# Patient Record
Sex: FEMALE | Race: BLACK OR AFRICAN AMERICAN | NOT HISPANIC OR LATINO | URBAN - METROPOLITAN AREA
[De-identification: names, ages, dates, MRNs, and addresses within clinical notes are randomized per-mention and may not be internally consistent; named-entity substitution may affect disease eponyms.]

---

## 2023-06-30 LAB
ABO GROUP (TYPE) IN BLOOD: NORMAL
ANTIBODY SCREEN: NORMAL
ERYTHROCYTE DISTRIBUTION WIDTH (RATIO) BY AUTOMATED COUNT: 13.3 % (ref 11.5–14.5)
ERYTHROCYTE MEAN CORPUSCULAR HEMOGLOBIN CONCENTRATION (G/DL) BY AUTOMATED: 32.4 G/DL (ref 32–36)
ERYTHROCYTE MEAN CORPUSCULAR VOLUME (FL) BY AUTOMATED COUNT: 88 FL (ref 80–100)
ERYTHROCYTES (10*6/UL) IN BLOOD BY AUTOMATED COUNT: 4.61 X10E12/L (ref 4–5.2)
ESTIMATED AVERAGE GLUCOSE FOR HBA1C: 97 MG/DL
HEMATOCRIT (%) IN BLOOD BY AUTOMATED COUNT: 40.4 % (ref 36–46)
HEMOGLOBIN (G/DL) IN BLOOD: 13.1 G/DL (ref 12–16)
HEMOGLOBIN A1C/HEMOGLOBIN TOTAL IN BLOOD: 5 %
HEPATITIS B VIRUS SURFACE AG PRESENCE IN SERUM: NONREACTIVE
HEPATITIS C VIRUS AB PRESENCE IN SERUM: NONREACTIVE
HIV 1/ 2 AG/AB SCREEN: NONREACTIVE
LEUKOCYTES (10*3/UL) IN BLOOD BY AUTOMATED COUNT: 12.5 X10E9/L (ref 4.4–11.3)
NRBC (PER 100 WBCS) BY AUTOMATED COUNT: 0 /100 WBC (ref 0–0)
PLATELETS (10*3/UL) IN BLOOD AUTOMATED COUNT: 309 X10E9/L (ref 150–450)
REFLEX ADDED, ANEMIA PANEL: ABNORMAL
RH FACTOR: NORMAL
RUBELLA VIRUS IGG AB: POSITIVE
SYPHILIS TOTAL AB: NONREACTIVE

## 2023-07-02 LAB — URINE CULTURE: NORMAL

## 2023-07-04 LAB
CHLAMYDIA TRACH., AMPLIFIED: NEGATIVE
N. GONORRHEA, AMPLIFIED: NEGATIVE
TRICHOMONAS VAGINALIS: NEGATIVE

## 2023-07-06 LAB
HEMOGLOBIN A2: 3.1 %
HEMOGLOBIN A: 95.2 %
HEMOGLOBIN F: 1.7 %
HEMOGLOBIN IDENTIFICATION INTERPRETATION: NORMAL
PATH REVIEW-HGB IDENTIFICATION: NORMAL

## 2023-09-23 ASSESSMENT — EDINBURGH POSTNATAL DEPRESSION SCALE (EPDS)
I HAVE LOOKED FORWARD WITH ENJOYMENT TO THINGS: AS MUCH AS I EVER DID
I HAVE BEEN ANXIOUS OR WORRIED FOR NO GOOD REASON: YES, SOMETIMES
I HAVE BLAMED MYSELF UNNECESSARILY WHEN THINGS WENT WRONG: NOT VERY OFTEN
I HAVE BEEN SO UNHAPPY THAT I HAVE HAD DIFFICULTY SLEEPING: YES, SOMETIMES
I HAVE BEEN SO UNHAPPY THAT I HAVE BEEN CRYING: ONLY OCCASIONALLY
I HAVE FELT SCARED OR PANICKY FOR NO GOOD REASON: NO, NOT AT ALL
THINGS HAVE BEEN GETTING ON TOP OF ME: NO, MOST OF THE TIME I HAVE COPED QUITE WELL
TOTAL SCORE: 10
I HAVE BEEN ABLE TO LAUGH AND SEE THE FUNNY SIDE OF THINGS: DEFINITELY NOT SO MUCH NOW
I HAVE FELT SAD OR MISERABLE: NOT VERY OFTEN
THE THOUGHT OF HARMING MYSELF HAS OCCURRED TO ME: NEVER

## 2023-10-12 PROBLEM — E66.9 OBESITY: Status: ACTIVE | Noted: 2023-10-12

## 2023-10-12 RX ORDER — MULTIVIT,IRON,MINERALS/LUTEIN
1 TABLET ORAL DAILY
COMMUNITY
Start: 2023-04-12

## 2023-10-12 RX ORDER — ALBUTEROL SULFATE 90 UG/1
2 AEROSOL, METERED RESPIRATORY (INHALATION) EVERY 4 HOURS PRN
COMMUNITY
Start: 2023-02-15

## 2023-10-12 RX ORDER — VITAMINS AND MINERALS 1; 1000; 100; 400; 30; 3; 3; 15; 20; 12; 7; 200; 29; 20 MG/1; [IU]/1; MG/1; [IU]/1; [IU]/1; MG/1; MG/1; MG/1; MG/1; UG/1; MG/1; MG/1; MG/1; MG/1
TABLET, CHEWABLE ORAL
Status: ON HOLD | COMMUNITY
Start: 2023-10-07 | End: 2023-10-16 | Stop reason: ALTCHOICE

## 2023-10-12 RX ORDER — IBUPROFEN 600 MG/1
TABLET ORAL
Status: ON HOLD | COMMUNITY
Start: 2019-06-12 | End: 2023-10-16 | Stop reason: ALTCHOICE

## 2023-10-12 RX ORDER — GUAIFENESIN 1200 MG
650 TABLET, EXTENDED RELEASE 12 HR ORAL EVERY 6 HOURS PRN
Status: ON HOLD | COMMUNITY
Start: 2019-06-12 | End: 2023-10-16 | Stop reason: ALTCHOICE

## 2023-10-12 RX ORDER — NORELGESTROMIN AND ETHINYL ESTRADIOL 150; 35 UG/D; UG/D
PATCH TRANSDERMAL
Status: ON HOLD | COMMUNITY
Start: 2022-12-30 | End: 2023-10-16 | Stop reason: ALTCHOICE

## 2023-10-12 RX ORDER — ASPIRIN 81 MG/1
162 TABLET ORAL DAILY
COMMUNITY
Start: 2023-10-07 | End: 2023-12-07 | Stop reason: HOSPADM

## 2023-10-12 RX ORDER — VALACYCLOVIR HYDROCHLORIDE 500 MG/1
TABLET, FILM COATED ORAL
COMMUNITY
Start: 2023-07-15 | End: 2023-11-16 | Stop reason: ALTCHOICE

## 2023-10-16 ENCOUNTER — HOSPITAL ENCOUNTER (OUTPATIENT)
Facility: HOSPITAL | Age: 23
Discharge: STILL ADMITTED OR ADMINISTRATIVE | End: 2023-10-16
Attending: OBSTETRICS & GYNECOLOGY | Admitting: OBSTETRICS & GYNECOLOGY
Payer: COMMERCIAL

## 2023-10-16 ENCOUNTER — HOSPITAL ENCOUNTER (EMERGENCY)
Facility: HOSPITAL | Age: 23
Discharge: STILL ADMITTED OR ADMINISTRATIVE | End: 2023-10-16
Payer: COMMERCIAL

## 2023-10-16 ENCOUNTER — HOSPITAL ENCOUNTER (OUTPATIENT)
Facility: HOSPITAL | Age: 23
Discharge: HOME | End: 2023-10-16
Attending: OBSTETRICS & GYNECOLOGY | Admitting: OBSTETRICS & GYNECOLOGY
Payer: COMMERCIAL

## 2023-10-16 ENCOUNTER — HOSPITAL ENCOUNTER (EMERGENCY)
Facility: HOSPITAL | Age: 23
Discharge: STILL ADMITTED OR ADMINISTRATIVE | End: 2023-10-16
Payer: COMMERCIAL

## 2023-10-16 VITALS
SYSTOLIC BLOOD PRESSURE: 110 MMHG | OXYGEN SATURATION: 100 % | RESPIRATION RATE: 18 BRPM | HEART RATE: 98 BPM | TEMPERATURE: 97.9 F | DIASTOLIC BLOOD PRESSURE: 69 MMHG | WEIGHT: 222.88 LBS

## 2023-10-16 VITALS
TEMPERATURE: 97.9 F | OXYGEN SATURATION: 99 % | HEART RATE: 98 BPM | SYSTOLIC BLOOD PRESSURE: 121 MMHG | RESPIRATION RATE: 16 BRPM | DIASTOLIC BLOOD PRESSURE: 70 MMHG

## 2023-10-16 DIAGNOSIS — R09.81 NASAL CONGESTION: ICD-10-CM

## 2023-10-16 DIAGNOSIS — O99.891 BACK PAIN AFFECTING PREGNANCY IN THIRD TRIMESTER (HHS-HCC): ICD-10-CM

## 2023-10-16 DIAGNOSIS — M54.9 BACK PAIN AFFECTING PREGNANCY IN THIRD TRIMESTER (HHS-HCC): ICD-10-CM

## 2023-10-16 DIAGNOSIS — R09.89 CHEST CONGESTION: ICD-10-CM

## 2023-10-16 DIAGNOSIS — Z3A.32 32 WEEKS GESTATION OF PREGNANCY (HHS-HCC): Primary | ICD-10-CM

## 2023-10-16 PROBLEM — Z36.4 ULTRASOUND FOR ANTENATAL SCREENING FOR FETAL GROWTH RESTRICTION (HHS-HCC): Status: ACTIVE | Noted: 2023-10-16

## 2023-10-16 PROBLEM — B00.9 HSV INFECTION: Status: ACTIVE | Noted: 2023-10-16

## 2023-10-16 PROBLEM — J45.30 MILD PERSISTENT ASTHMA (HHS-HCC): Status: ACTIVE | Noted: 2022-03-01

## 2023-10-16 LAB
FLUAV RNA RESP QL NAA+PROBE: NOT DETECTED
FLUBV RNA RESP QL NAA+PROBE: NOT DETECTED
POC APPEARANCE, URINE: CLEAR
POC BILIRUBIN, URINE: NEGATIVE
POC BLOOD, URINE: NEGATIVE
POC COLOR, URINE: YELLOW
POC GLUCOSE, URINE: NEGATIVE MG/DL
POC KETONES, URINE: NEGATIVE MG/DL
POC LEUKOCYTES, URINE: NEGATIVE
POC NITRITE,URINE: NEGATIVE
POC PH, URINE: 6 PH
POC PROTEIN, URINE: ABNORMAL MG/DL
POC SPECIFIC GRAVITY, URINE: >=1.03
POC UROBILINOGEN, URINE: 2 EU/DL
RSV RNA RESP QL NAA+PROBE: NOT DETECTED
SARS-COV-2 ORF1AB RESP QL NAA+PROBE: NOT DETECTED

## 2023-10-16 PROCEDURE — 59025 FETAL NON-STRESS TEST: CPT

## 2023-10-16 PROCEDURE — 4500999001 HC ED NO CHARGE

## 2023-10-16 PROCEDURE — 87634 RSV DNA/RNA AMP PROBE: CPT

## 2023-10-16 PROCEDURE — 81002 URINALYSIS NONAUTO W/O SCOPE: CPT

## 2023-10-16 PROCEDURE — 87631 RESP VIRUS 3-5 TARGETS: CPT | Mod: CMCLAB

## 2023-10-16 PROCEDURE — 99213 OFFICE O/P EST LOW 20 MIN: CPT

## 2023-10-16 PROCEDURE — 99215 OFFICE O/P EST HI 40 MIN: CPT | Mod: 25

## 2023-10-16 RX ORDER — GUAIFENESIN 600 MG/1
600 TABLET, EXTENDED RELEASE ORAL 2 TIMES DAILY PRN
Qty: 8 TABLET | Refills: 0 | Status: SHIPPED | OUTPATIENT
Start: 2023-10-16 | End: 2023-12-07 | Stop reason: HOSPADM

## 2023-10-16 RX ORDER — ACETAMINOPHEN 325 MG/1
975 TABLET ORAL ONCE
Status: DISCONTINUED | OUTPATIENT
Start: 2023-10-16 | End: 2023-10-16 | Stop reason: HOSPADM

## 2023-10-16 RX ORDER — METOCLOPRAMIDE HYDROCHLORIDE 5 MG/ML
10 INJECTION INTRAMUSCULAR; INTRAVENOUS ONCE
Status: DISCONTINUED | OUTPATIENT
Start: 2023-10-16 | End: 2023-10-16 | Stop reason: HOSPADM

## 2023-10-16 RX ORDER — METOCLOPRAMIDE 10 MG/1
10 TABLET ORAL ONCE
Status: DISCONTINUED | OUTPATIENT
Start: 2023-10-16 | End: 2023-10-16 | Stop reason: HOSPADM

## 2023-10-16 RX ORDER — GUAIFENESIN 600 MG/1
600 TABLET, EXTENDED RELEASE ORAL 2 TIMES DAILY PRN
Status: DISCONTINUED | OUTPATIENT
Start: 2023-10-16 | End: 2023-10-16 | Stop reason: HOSPADM

## 2023-10-16 SDOH — SOCIAL STABILITY: SOCIAL INSECURITY: ABUSE SCREEN: ADULT

## 2023-10-16 SDOH — SOCIAL STABILITY: SOCIAL INSECURITY: HAS ANYONE EVER THREATENED TO HURT YOUR FAMILY OR YOUR PETS?: NO

## 2023-10-16 SDOH — HEALTH STABILITY: MENTAL HEALTH: NON-SPECIFIC ACTIVE SUICIDAL THOUGHTS (PAST 1 MONTH): NO

## 2023-10-16 SDOH — SOCIAL STABILITY: SOCIAL INSECURITY: DOES ANYONE TRY TO KEEP YOU FROM HAVING/CONTACTING OTHER FRIENDS OR DOING THINGS OUTSIDE YOUR HOME?: NO

## 2023-10-16 SDOH — ECONOMIC STABILITY: HOUSING INSECURITY: DO YOU FEEL UNSAFE GOING BACK TO THE PLACE WHERE YOU ARE LIVING?: NO

## 2023-10-16 SDOH — SOCIAL STABILITY: SOCIAL INSECURITY: ARE YOU OR HAVE YOU BEEN THREATENED OR ABUSED PHYSICALLY, EMOTIONALLY, OR SEXUALLY BY ANYONE?: NO

## 2023-10-16 SDOH — HEALTH STABILITY: MENTAL HEALTH: WERE YOU ABLE TO COMPLETE ALL THE BEHAVIORAL HEALTH SCREENINGS?: YES

## 2023-10-16 SDOH — SOCIAL STABILITY: SOCIAL INSECURITY: ARE THERE ANY APPARENT SIGNS OF INJURIES/BEHAVIORS THAT COULD BE RELATED TO ABUSE/NEGLECT?: NO

## 2023-10-16 SDOH — SOCIAL STABILITY: SOCIAL INSECURITY: VERBAL ABUSE: DENIES

## 2023-10-16 SDOH — HEALTH STABILITY: MENTAL HEALTH: WISH TO BE DEAD (PAST 1 MONTH): NO

## 2023-10-16 SDOH — HEALTH STABILITY: MENTAL HEALTH: SUICIDAL BEHAVIOR (LIFETIME): NO

## 2023-10-16 SDOH — HEALTH STABILITY: MENTAL HEALTH: HAVE YOU USED ANY PRESCRIPTION DRUGS OTHER THAN PRESCRIBED IN THE PAST 12 MONTHS?: NO

## 2023-10-16 SDOH — SOCIAL STABILITY: SOCIAL INSECURITY: DO YOU FEEL ANYONE HAS EXPLOITED OR TAKEN ADVANTAGE OF YOU FINANCIALLY OR OF YOUR PERSONAL PROPERTY?: NO

## 2023-10-16 SDOH — HEALTH STABILITY: MENTAL HEALTH: HAVE YOU USED ANY SUBSTANCES (CANABIS, COCAINE, HEROIN, HALLUCINOGENS, INHALANTS, ETC.) IN THE PAST 12 MONTHS?: NO

## 2023-10-16 SDOH — SOCIAL STABILITY: SOCIAL INSECURITY: HAVE YOU HAD THOUGHTS OF HARMING ANYONE ELSE?: NO

## 2023-10-16 SDOH — SOCIAL STABILITY: SOCIAL INSECURITY: PHYSICAL ABUSE: DENIES

## 2023-10-16 ASSESSMENT — LIFESTYLE VARIABLES
AUDIT-C TOTAL SCORE: 0
HOW OFTEN DO YOU HAVE A DRINK CONTAINING ALCOHOL: NEVER
HOW OFTEN DO YOU HAVE 6 OR MORE DRINKS ON ONE OCCASION: NEVER
HOW MANY STANDARD DRINKS CONTAINING ALCOHOL DO YOU HAVE ON A TYPICAL DAY: PATIENT DOES NOT DRINK
SKIP TO QUESTIONS 9-10: 1
AUDIT-C TOTAL SCORE: 0

## 2023-10-16 ASSESSMENT — PAIN SCALES - GENERAL: PAINLEVEL: 10 - WORST POSSIBLE PAIN

## 2023-10-16 ASSESSMENT — PATIENT HEALTH QUESTIONNAIRE - PHQ9
2. FEELING DOWN, DEPRESSED OR HOPELESS: NOT AT ALL
SUM OF ALL RESPONSES TO PHQ9 QUESTIONS 1 & 2: 0
1. LITTLE INTEREST OR PLEASURE IN DOING THINGS: NOT AT ALL

## 2023-10-16 NOTE — H&P
"Obstetrical Admission History and Physical     Rizwan Espana is a 23 y.o.  at 32.2 wga by LMP, c/w 20.6 week US, presenting to OB triage with flu-like symptoms x 2 days.     Chief Complaint: Headache, Back Pain, Nasal Congestion, and Chest Pain    Assessment/Plan      Flu-like symptoms  - Flu A&B, RSV, and COVID swabs pending on patient discharge from OB triage, will call if positive   - Urine chemistry s/f SG > 1.030, 1+ protein  - VS n/f tachycardia, 100-110s, BP WNL, SpO2 100% on RA  - EKG WNL, sinus tachycardia  - PO hydration in triage, HR <110  - Declines medications in triage, but wants mucinex rx for home  - Encouraged increasing PO hydration, elevating HOB for sleep, warm shower before bed, saline nasal spray as needed  - Encouraged patient to get pregnancy support band  - Return precautions discussed, patient verbalizes understanding    IUP at 32.2 wga  - NST reactive  - SVE 0/0/-3  - Good fetal movement  - Continue routine prenatal care  - Next appt: patient to schedule within the week, 1 hr GTT to be completed       Maternal Well-being  - Vital signs stable and WNL  - All questions and concerns addressed     Dispo  - patient appropriate for d/c home, agrees with plan  - f/u at next scheduled OB appt or to triage sooner as needed     Plan of care d/w Dr. Slaughter    Active Problems:    HSV infection    Ultrasound for  screening for fetal growth restriction      Pregnancy Problems (from 24 to present)       No problems associated with this episode.          Subjective   Rizwan is here with flu-like symptoms x 2 days. Good fetal movement. Denies vaginal bleeding., Denies contractions., Denies leaking of fluid.      Patient reports headache, nasal congestion, chest pressure, back pain for the past two days. She denies chills, but reports \"feeling hot.\" She denies sore throat, chest pain, worsened SOB, and cough. She has been experiencing SOB in pregnancy, denies asthma symptoms. " Back pain has been present for weeks- mid-upper back down to low back, symmetrical on both sides. Pain is constant. Patient had someone text her about a pregnancy support band but did not respond.     Obstetrical History   OB History    Para Term  AB Living   1 0 0 0 0 0   SAB IAB Ectopic Multiple Live Births   0 0 0 0 0      # Outcome Date GA Lbr Sergio/2nd Weight Sex Delivery Anes PTL Lv   1 Current                Past Medical History  Past Medical History:   Diagnosis Date    Asthma     Herpes         Past Surgical History   No past surgical history on file.    Social History  Social History     Tobacco Use    Smoking status: Never    Smokeless tobacco: Never   Substance Use Topics    Alcohol use: Not Currently     Substance and Sexual Activity   Drug Use Never       Allergies  Patient has no known allergies.     Medications  No medications prior to admission.       Objective    Last Vitals  Temp Pulse Resp BP MAP O2 Sat   36.6 °C (97.9 °F) 98 18 110/69 77 mmHg 100 %     Physical Examination  LUNGS: clear to auscultation bilaterally  FHR is 155 , with Accelerations, and a Category I tracing.    [place] readin-4 min  CERVIX: Closed cm dilated, 0 % effaced, -3 station; MEMBRANES are Intact    Lab Review  Labs in chart were reviewed.

## 2023-10-16 NOTE — ED TRIAGE NOTES
32 weeks OB, due , , says she 'doesn't feel good,' nasal congestion, abd pain, back pain, chest pain with no SOB/radiation/PMH.

## 2023-10-26 ENCOUNTER — LAB (OUTPATIENT)
Dept: LAB | Facility: LAB | Age: 23
End: 2023-10-26
Payer: COMMERCIAL

## 2023-10-26 DIAGNOSIS — Z3A.29 29 WEEKS GESTATION OF PREGNANCY (HHS-HCC): Primary | ICD-10-CM

## 2023-10-26 LAB
ERYTHROCYTE [DISTWIDTH] IN BLOOD BY AUTOMATED COUNT: 13.3 % (ref 11.5–14.5)
GLUCOSE 1H P 50 G GLC PO SERPL-MCNC: 141 MG/DL
HCT VFR BLD AUTO: 37.4 % (ref 36–46)
HGB BLD-MCNC: 12.1 G/DL (ref 12–16)
MCH RBC QN AUTO: 28.1 PG (ref 26–34)
MCHC RBC AUTO-ENTMCNC: 32.4 G/DL (ref 32–36)
MCV RBC AUTO: 87 FL (ref 80–100)
NRBC BLD-RTO: 0 /100 WBCS (ref 0–0)
PLATELET # BLD AUTO: 253 X10*3/UL (ref 150–450)
PMV BLD AUTO: 9.9 FL (ref 7.5–11.5)
RBC # BLD AUTO: 4.3 X10*6/UL (ref 4–5.2)
T PALLIDUM AB SER QL: NONREACTIVE
WBC # BLD AUTO: 9.5 X10*3/UL (ref 4.4–11.3)

## 2023-10-26 PROCEDURE — 82947 ASSAY GLUCOSE BLOOD QUANT: CPT

## 2023-10-26 PROCEDURE — 36415 COLL VENOUS BLD VENIPUNCTURE: CPT

## 2023-10-26 PROCEDURE — 85027 COMPLETE CBC AUTOMATED: CPT

## 2023-10-26 PROCEDURE — 86780 TREPONEMA PALLIDUM: CPT

## 2023-10-27 DIAGNOSIS — R73.09 ELEVATED GLUCOSE LEVEL: Primary | ICD-10-CM

## 2023-10-30 ENCOUNTER — TELEPHONE (OUTPATIENT)
Dept: OBSTETRICS AND GYNECOLOGY | Facility: CLINIC | Age: 23
End: 2023-10-30
Payer: COMMERCIAL

## 2023-10-30 NOTE — TELEPHONE ENCOUNTER
----- Message from MIKE Scott-CNM sent at 10/27/2023  9:55 AM EDT -----  Rizwan has an elevated 1hr needs to complete a 3hr order will be placed     10/30/2023  TELEPHONE CALL TO PATIENT  Identified by name and date of birth.  Reviewed results and treatment plan  Need for 3 hr GTT, NPO except water the night before  Patient verbalizes understanding  May need work excuse, will let RN know  ROSELYN Cam RN

## 2023-10-31 ENCOUNTER — LAB (OUTPATIENT)
Dept: LAB | Facility: LAB | Age: 23
End: 2023-10-31
Payer: COMMERCIAL

## 2023-10-31 ENCOUNTER — ANCILLARY PROCEDURE (OUTPATIENT)
Dept: RADIOLOGY | Facility: CLINIC | Age: 23
End: 2023-10-31
Payer: COMMERCIAL

## 2023-10-31 DIAGNOSIS — Z03.74 ENCOUNTER FOR SUSPECTED PROBLEM WITH FETAL GROWTH RULED OUT: ICD-10-CM

## 2023-10-31 DIAGNOSIS — O36.5990 IUGR (INTRAUTERINE GROWTH RESTRICTION) AFFECTING CARE OF MOTHER (HHS-HCC): ICD-10-CM

## 2023-10-31 DIAGNOSIS — R73.09 ELEVATED GLUCOSE LEVEL: ICD-10-CM

## 2023-10-31 LAB
GLUCOSE 1H P 100 G GLC PO SERPL-MCNC: 160 MG/DL
GLUCOSE 2H P 100 G GLC PO SERPL-MCNC: 132 MG/DL
GLUCOSE 3H P 100 G GLC PO SERPL-MCNC: 107 MG/DL
GLUCOSE P FAST SERPL-MCNC: 72 MG/DL

## 2023-10-31 PROCEDURE — 82952 GTT-ADDED SAMPLES: CPT

## 2023-10-31 PROCEDURE — 82950 GLUCOSE TEST: CPT

## 2023-10-31 PROCEDURE — 76819 FETAL BIOPHYS PROFIL W/O NST: CPT

## 2023-10-31 PROCEDURE — 76816 OB US FOLLOW-UP PER FETUS: CPT

## 2023-10-31 PROCEDURE — 76819 FETAL BIOPHYS PROFIL W/O NST: CPT | Performed by: OBSTETRICS & GYNECOLOGY

## 2023-10-31 PROCEDURE — 36415 COLL VENOUS BLD VENIPUNCTURE: CPT

## 2023-10-31 PROCEDURE — 76816 OB US FOLLOW-UP PER FETUS: CPT | Performed by: OBSTETRICS & GYNECOLOGY

## 2023-11-07 ENCOUNTER — HOSPITAL ENCOUNTER (OUTPATIENT)
Facility: HOSPITAL | Age: 23
End: 2023-11-07
Attending: OBSTETRICS & GYNECOLOGY | Admitting: OBSTETRICS & GYNECOLOGY
Payer: COMMERCIAL

## 2023-11-07 ENCOUNTER — HOSPITAL ENCOUNTER (OUTPATIENT)
Facility: HOSPITAL | Age: 23
Discharge: HOME | End: 2023-11-07
Attending: OBSTETRICS & GYNECOLOGY | Admitting: OBSTETRICS & GYNECOLOGY
Payer: COMMERCIAL

## 2023-11-07 VITALS
TEMPERATURE: 97.5 F | HEART RATE: 91 BPM | OXYGEN SATURATION: 99 % | BODY MASS INDEX: 37.24 KG/M2 | WEIGHT: 231.7 LBS | RESPIRATION RATE: 19 BRPM | SYSTOLIC BLOOD PRESSURE: 110 MMHG | DIASTOLIC BLOOD PRESSURE: 58 MMHG | HEIGHT: 66 IN

## 2023-11-07 LAB
BILIRUBIN, POC: NEGATIVE
BLOOD URINE, POC: NEGATIVE
CLARITY, POC: CLEAR
COLOR, POC: YELLOW
GLUCOSE URINE, POC: NEGATIVE
KETONES, POC: NEGATIVE
LEUKOCYTE EST, POC: NEGATIVE
NITRITE, POC: NEGATIVE
PH, POC: 6
POC APPEARANCE OF BODY FLUID: NORMAL
SPECIFIC GRAVITY, POC: 1.03
URINE PROTEIN, POC: NEGATIVE
UROBILINOGEN, POC: 1

## 2023-11-07 PROCEDURE — 99214 OFFICE O/P EST MOD 30 MIN: CPT

## 2023-11-07 ASSESSMENT — PAIN SCALES - GENERAL: PAINLEVEL: 6

## 2023-11-07 NOTE — H&P
Obstetrical Admission History and Physical    Subjective  Rizwan Espana  is a 23 y.o.    at 35w3d . Estimated Date of Delivery: 23  by LMP c/w 21.2 US.  She has had prenatal care with Jean Soria CNM Pt presents for a term IOL. She endorses Good fetal movement. Denies vaginal bleeding., Denies contractions., Denies leaking of fluid.  She presents today with concerns of 10/10 pelvic and vaginal pain. 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 Pt notes that it worsens when she walks. She denies a history of trauma and falls. Pt declined all medications.     Chief Complaint:   Chief Complaint   Patient presents with    Back Pain    Pelvic Pain        Pregnancy notable for:  - Resolved FGR 10/31 US EFW 32%tile, AC 31%tile   - HSV on valtrex   - Asthma       Obstetrical History   OB History    Para Term  AB Living   1 0 0 0 0 0   SAB IAB Ectopic Multiple Live Births   0 0 0 0 0      # Outcome Date GA Lbr Sergio/2nd Weight Sex Delivery Anes PTL Lv   1 Current                Past Medical History  Past Medical History:   Diagnosis Date    Asthma     Herpes         Past Surgical History   No past surgical history on file.    Social History  Social History     Tobacco Use    Smoking status: Never    Smokeless tobacco: Never   Substance Use Topics    Alcohol use: Not Currently     Substance and Sexual Activity   Drug Use Never       Allergies  Patient has no known allergies.     Medications  Medications Prior to Admission   Medication Sig Dispense Refill Last Dose    aspirin 81 mg EC tablet Take 2 tablets (162 mg) by mouth once daily.       guaiFENesin (Mucinex) 600 mg 12 hr tablet Take 1 tablet (600 mg) by mouth 2 times a day as needed for cough. Do not crush, chew, or split. 8 tablet 0     Prenatal Multi  mg-mcg tablet Take 1 tablet by mouth once daily.       sodium chloride (Ocean) 0.65 % nasal spray  Administer 1 spray into each nostril if needed for congestion. 30 mL 0     valACYclovir (Valtrex) 500 mg tablet Take 1 tablet by mouth twice a day for 5 days at onset of outbreak       Ventolin HFA 90 mcg/actuation inhaler Inhale 2 puffs every 4 hours if needed.          Objective    Last Vitals  Temp Pulse Resp BP MAP O2 Sat   36.4 °C (97.5 °F) 91 19 110/58   99 %     OB Physical Exam:   Cervical Exam: 1/20/-3    UA: wnl     FHR     Baseline: 150 bpm      Variability: moderate       Accels: present      Decels: absent     TOCO: irritable     Membrane status: Intact     Physical Examination  GENERAL: Examination reveals a well developed, well nourished, gravid female in no acute distress. She is alert and cooperative.  VAGINA: normal appearing vagina with normal color and discharge and no lesions noted  CERVIX: closed and thick; MEMBRANES are Intact  SKIN: normal coloration and turgor, no rashes  NEUROLOGICAL: alert, oriented, normal speech, no focal findings or movement disorder noted      Patient Active Problem List   Diagnosis    Obesity    Mild persistent asthma    HSV infection    Ultrasound for  screening for fetal growth restriction        Assessment/Plan      Pelvic pain   - Supportive measures, encouraged light stretching, abdominal band support, warm compress and warm baths, pt declined all medications and pharmacologic therapies   -RN gave pt abdominal band in triage, pt notes that she felt some relief following use    -[place]ed [place] for belly band     IUP at 35w3d   - NST reactive   -SVE 1/20/-3   -Good fetal movement   -Routine prenatal care     Maternal wellbeing   -VSS and wnl   -Answered all questions and provided reassurance     Dispo  -patient appropriate for discharge home  -follow up at next scheduled OB appt or triage sooner     -Next appt:  with Jean Soria    Plan and NST reviewed d/w Dr. Edel Lacey, APRN-CNM

## 2023-11-16 ENCOUNTER — ROUTINE PRENATAL (OUTPATIENT)
Dept: OBSTETRICS AND GYNECOLOGY | Facility: CLINIC | Age: 23
End: 2023-11-16
Payer: COMMERCIAL

## 2023-11-16 ENCOUNTER — PHARMACY VISIT (OUTPATIENT)
Dept: PHARMACY | Facility: CLINIC | Age: 23
End: 2023-11-16
Payer: MEDICAID

## 2023-11-16 VITALS — BODY MASS INDEX: 36.96 KG/M2 | SYSTOLIC BLOOD PRESSURE: 106 MMHG | DIASTOLIC BLOOD PRESSURE: 71 MMHG | WEIGHT: 229 LBS

## 2023-11-16 DIAGNOSIS — B00.9 HSV INFECTION: ICD-10-CM

## 2023-11-16 DIAGNOSIS — O26.843 UTERINE SIZE-DATE DISCREPANCY IN THIRD TRIMESTER (HHS-HCC): ICD-10-CM

## 2023-11-16 DIAGNOSIS — Z3A.36 36 WEEKS GESTATION OF PREGNANCY (HHS-HCC): Primary | ICD-10-CM

## 2023-11-16 DIAGNOSIS — E66.9 CLASS II OBESITY: ICD-10-CM

## 2023-11-16 DIAGNOSIS — Z36.4 ULTRASOUND FOR ANTENATAL SCREENING FOR FETAL GROWTH RESTRICTION (HHS-HCC): ICD-10-CM

## 2023-11-16 DIAGNOSIS — Z36.85 ANTENATAL SCREENING FOR STREPTOCOCCUS B (HHS-HCC): ICD-10-CM

## 2023-11-16 PROCEDURE — 87081 CULTURE SCREEN ONLY: CPT | Performed by: ADVANCED PRACTICE MIDWIFE

## 2023-11-16 PROCEDURE — 99214 OFFICE O/P EST MOD 30 MIN: CPT | Mod: TH | Performed by: ADVANCED PRACTICE MIDWIFE

## 2023-11-16 PROCEDURE — 99214 OFFICE O/P EST MOD 30 MIN: CPT | Performed by: ADVANCED PRACTICE MIDWIFE

## 2023-11-16 RX ORDER — VALACYCLOVIR HYDROCHLORIDE 500 MG/1
500 TABLET, FILM COATED ORAL 2 TIMES DAILY
Qty: 60 TABLET | Refills: 0 | Status: SHIPPED | OUTPATIENT
Start: 2023-11-16 | End: 2023-12-07 | Stop reason: HOSPADM

## 2023-11-16 NOTE — ASSESSMENT & PLAN NOTE
Rx sent patient to start immediately   Notify of any HSV outbreaks   Will have  birth if HSV outbreak at delivery

## 2023-11-16 NOTE — PROGRESS NOTES
Dictation #1  MRN:[id number]  CSN:[id number] Assessment/Plan   Problem List Items Addressed This Visit             ICD-10-CM       Ob-Gyn Problems    Ultrasound for  screening for fetal growth restriction Z36.4     Plan to discuss 39 wk del for prior FGR         Relevant Orders    US MAC OB imaging order       Other    HSV infection B00.9     Rx sent patient to start immediately   Notify of any HSV outbreaks   Will have  birth if HSV outbreak at delivery          Relevant Medications    valACYclovir (Valtrex) 500 mg tablet     Other Visit Diagnoses         Codes    36 weeks gestation of pregnancy    -  Primary Z3A.36    Relevant Medications    valACYclovir (Valtrex) 500 mg tablet    Other Relevant Orders    US MAC OB imaging order     screening for streptococcus B     Z36.85    Relevant Orders    Group B Streptococcus (GBS) Prenatal Screen, Culture    Class II obesity     E66.9    Uterine size-date discrepancy in third trimester     O26.843    Relevant Orders    US MAC OB imaging order            Coping mechanisms and pain management options during labor discussed, epidural   Postpartum contraception options discussed, patch 6wks  Discussed routine GBS screening, to be completed today  Discussed expectations and methods used for IOL  Planning 39wk IOL  Reviewed s/sx of PTL, warning signs, fetal movement counts, and when to call provider  Follow up in 1 week for a routine prenatal visit.    MIKE Scott-DENIA    Subjective     Rizwan Espana is a 23 y.o.  at 36w5d with a working estimated date of delivery of 2023, by Last Menstrual Period who presents for a routine prenatal visit. She denies vaginal bleeding, leakage of fluid, decreased fetal movements, or contractions.    Her pregnancy is complicated by:  Pregnancy Problems (from 24 to present)       Problem Noted Resolved    HSV infection 10/16/2023 by Marilee Tellez, MIKE-CNP No    Priority:  Medium       "Overview Signed 2023  9:45 AM by EDGAR Scott     HSV prophylaxis starting 36wk                  Objective   Physical Exam:   Weight: 104 kg (229 lb)  Expected Total Weight Gain: Could not be calculated   Pregravid BMI: Could not be calculated  BP: 106/71 (Pt heart rate 92)  Fetal Heart Rate: 135 Fundal Height (cm): 37 cm Presentation: Vertex           Postpartum Depression: High Risk (2023)    [place]  Depression Scale     Last EPDS Total Score: 10     Last EPDS Self Harm Result: Never        Prenatal Labs  Lab Results   Component Value Date    HGB 12.1 10/26/2023    HCT 37.4 10/26/2023    ABO A 2023    HEPBSAG NONREACTIVE 2023     No results found for: \"GLUF\", \"GLUT1\", \"JHNIKOX8NY\", \"YMSYFIO4JR\"  No results found for: \"GBS\"     Imaging  The most recent ultrasound was performed on The most recent ultrasound study is not finalized with a study GA of The most recent ultrasound study is not finalized and EFW of The most recent ultrasound study is not finalized.  The most recent ultrasound study is not finalized  The most recent ultrasound study is not finalized  "

## 2023-11-19 LAB — GP B STREP GENITAL QL CULT: NORMAL

## 2023-11-20 ENCOUNTER — APPOINTMENT (OUTPATIENT)
Dept: RADIOLOGY | Facility: CLINIC | Age: 23
End: 2023-11-20
Payer: COMMERCIAL

## 2023-11-21 ENCOUNTER — ROUTINE PRENATAL (OUTPATIENT)
Dept: OBSTETRICS AND GYNECOLOGY | Facility: CLINIC | Age: 23
End: 2023-11-21
Payer: COMMERCIAL

## 2023-11-21 ENCOUNTER — ANCILLARY PROCEDURE (OUTPATIENT)
Dept: RADIOLOGY | Facility: CLINIC | Age: 23
End: 2023-11-21
Payer: COMMERCIAL

## 2023-11-21 VITALS — SYSTOLIC BLOOD PRESSURE: 109 MMHG | DIASTOLIC BLOOD PRESSURE: 71 MMHG | BODY MASS INDEX: 36.85 KG/M2 | WEIGHT: 228.3 LBS

## 2023-11-21 DIAGNOSIS — Z3A.37 37 WEEKS GESTATION OF PREGNANCY (HHS-HCC): Primary | ICD-10-CM

## 2023-11-21 DIAGNOSIS — B00.9 HSV INFECTION: ICD-10-CM

## 2023-11-21 DIAGNOSIS — Z36.4 ULTRASOUND FOR ANTENATAL SCREENING FOR FETAL GROWTH RESTRICTION (HHS-HCC): ICD-10-CM

## 2023-11-21 DIAGNOSIS — O99.210 OBESITY AFFECTING PREGNANCY (HHS-HCC): ICD-10-CM

## 2023-11-21 DIAGNOSIS — Z3A.36 36 WEEKS GESTATION OF PREGNANCY (HHS-HCC): ICD-10-CM

## 2023-11-21 DIAGNOSIS — O26.843 UTERINE SIZE-DATE DISCREPANCY IN THIRD TRIMESTER (HHS-HCC): ICD-10-CM

## 2023-11-21 DIAGNOSIS — Z34.90 ENCOUNTER FOR SUPERVISION OF NORMAL PREGNANCY, ANTEPARTUM, UNSPECIFIED GRAVIDITY (HHS-HCC): ICD-10-CM

## 2023-11-21 PROCEDURE — 76816 OB US FOLLOW-UP PER FETUS: CPT | Performed by: OBSTETRICS & GYNECOLOGY

## 2023-11-21 PROCEDURE — 76819 FETAL BIOPHYS PROFIL W/O NST: CPT

## 2023-11-21 PROCEDURE — 76819 FETAL BIOPHYS PROFIL W/O NST: CPT | Performed by: OBSTETRICS & GYNECOLOGY

## 2023-11-21 PROCEDURE — 99213 OFFICE O/P EST LOW 20 MIN: CPT | Performed by: OBSTETRICS & GYNECOLOGY

## 2023-11-21 PROCEDURE — 76816 OB US FOLLOW-UP PER FETUS: CPT

## 2023-11-21 PROCEDURE — 99213 OFFICE O/P EST LOW 20 MIN: CPT | Mod: GC,TH | Performed by: OBSTETRICS & GYNECOLOGY

## 2023-11-21 PROCEDURE — 90715 TDAP VACCINE 7 YRS/> IM: CPT | Mod: GC

## 2023-11-21 NOTE — PROGRESS NOTES
Subjective     Rizwan Espana is a 23 y.o.  at 37w3d with a working estimated date of delivery of 2023, by Last Menstrual Period who presents for a routine prenatal visit. She denies vaginal bleeding, leakage of fluid, decreased fetal movements, or contractions. Would like to discuss IOL.     Her pregnancy is complicated by:  - HSV- On valtrex  - FGR- now resolved  - elevated 1h, normal 3h     Objective   Physical Exam:   Weight: 104 kg (228 lb 4.8 oz)  Expected Total Weight Gain: 7 kg (15 lb)-11.5 kg (25 lb)   Pregravid BMI: 29.07  BP: 109/71 ()                  Prenatal Labs  Urine Dip:  Lab Results   Component Value Date    KETONESU Negative 2023    GLUCOSEUR NEGATIVE 2023    LEUKOCYTESUR NEGATIVE 2023     Lab Results   Component Value Date    HGB 12.1 10/26/2023    HCT 37.4 10/26/2023    ABO A 2023    HEPBSAG NONREACTIVE 2023       Assessment/Plan   Problem List Items Addressed This Visit       HSV infection    Overview     HSV prophylaxis starting 36wk         Current Assessment & Plan     - Continue Valtrex  - Reviewed indications for CS at time of delivery         Ultrasound for  screening for fetal growth restriction    Overview     23: EFW 10th%tile AC5th%tile  Resolved but continues to get regular US         Current Assessment & Plan     - FGR now resolved   - Most recent BPP today, - , normal interval growth. EFW 35%, AC 34%  - Continues to get regular growth scans         Encounter for supervision of normal pregnancy, antepartum    Overview     Dating:   [x] Initial BMI: 29  [x] Prenatal Labs: 2023  [x] Aneuploidy Screening:  rrr cfDNA 23  [x] Baby ASA: continue   [x] Anatomy US: - WNL  [x] 1hr GCT at 24-28wks: Abnormal 1h, normal 3h gtt  [x] Tdap (27-36wks): Received 2023  [x] Flu Shot: Declined 2023  [] COVID vaccine:   [x] Rhogam (if Rh neg): Not indicated  [x] GBS at 36 wks: Negative as of  11/16  [x] Breastfeeding- Desires to breastfeed  [x] PPBC: Desires patch. Counseled on contraindications, including elevated BMI. Additionally discussed elevated VTE risk and potentially decreased milk supply in the postpartum period with estrogen-containing BC. States she plans to pursue abstinence. Continue to address at subsequent visits.   [x] 39 weeks discussion of IOL vs. Expectant management: Desires 39wk IOL. Will message for scheduling. Reviewed IOL process  [x] Mode of delivery:  Desires VB. Discussed potential need for CS for maternal/fetal indications or if experiencing active HSV outbreak           Other Visit Diagnoses       37 weeks gestation of pregnancy    -  Primary    Relevant Orders    Tdap vaccine, age 7 years and older  (BOOSTRIX) (Completed)

## 2023-11-22 ENCOUNTER — TELEPHONE (OUTPATIENT)
Dept: OBSTETRICS AND GYNECOLOGY | Facility: CLINIC | Age: 23
End: 2023-11-22
Payer: COMMERCIAL

## 2023-11-22 NOTE — TELEPHONE ENCOUNTER
----- Message from Nyasia Dewitt MD sent at 11/21/2023  5:01 PM EST -----  Hi!     This patient is 37.3wga as of today (11/21). Could we schedule her for IOL at 39 weeks? Please let me know if there is something that I would need to do on my end (order, case request, etc.) Thanks!      ROOSEVELT Dewitt MD  PGY-1, Obstetrics & Gynecology   [facility]

## 2023-11-25 DIAGNOSIS — Z34.90 ENCOUNTER FOR INDUCTION OF LABOR (HHS-HCC): Primary | ICD-10-CM

## 2023-12-04 ENCOUNTER — ANESTHESIA EVENT (OUTPATIENT)
Dept: OBSTETRICS AND GYNECOLOGY | Facility: HOSPITAL | Age: 23
End: 2023-12-04
Payer: COMMERCIAL

## 2023-12-04 ENCOUNTER — ANESTHESIA (OUTPATIENT)
Dept: OBSTETRICS AND GYNECOLOGY | Facility: HOSPITAL | Age: 23
End: 2023-12-04
Payer: COMMERCIAL

## 2023-12-04 ENCOUNTER — HOSPITAL ENCOUNTER (INPATIENT)
Facility: HOSPITAL | Age: 23
LOS: 3 days | Discharge: HOME | End: 2023-12-07
Attending: OBSTETRICS & GYNECOLOGY | Admitting: MIDWIFE
Payer: COMMERCIAL

## 2023-12-04 DIAGNOSIS — Z3A.39 39 WEEKS GESTATION OF PREGNANCY (HHS-HCC): Primary | ICD-10-CM

## 2023-12-04 DIAGNOSIS — Z36.4 ULTRASOUND FOR ANTENATAL SCREENING FOR FETAL GROWTH RESTRICTION (HHS-HCC): ICD-10-CM

## 2023-12-04 DIAGNOSIS — B00.9 HSV INFECTION: ICD-10-CM

## 2023-12-04 DIAGNOSIS — I49.49 PREMATURE BEATS: ICD-10-CM

## 2023-12-04 LAB
ABO GROUP (TYPE) IN BLOOD: NORMAL
ANTIBODY SCREEN: NORMAL
ERYTHROCYTE [DISTWIDTH] IN BLOOD BY AUTOMATED COUNT: 13.7 % (ref 11.5–14.5)
HCT VFR BLD AUTO: 44.1 % (ref 36–46)
HGB BLD-MCNC: 13.5 G/DL (ref 12–16)
MCH RBC QN AUTO: 27.6 PG (ref 26–34)
MCHC RBC AUTO-ENTMCNC: 30.6 G/DL (ref 32–36)
MCV RBC AUTO: 90 FL (ref 80–100)
NRBC BLD-RTO: 0 /100 WBCS (ref 0–0)
PLATELET # BLD AUTO: 224 X10*3/UL (ref 150–450)
RBC # BLD AUTO: 4.89 X10*6/UL (ref 4–5.2)
RH FACTOR (ANTIGEN D): NORMAL
T PALLIDUM AB SER QL: NONREACTIVE
WBC # BLD AUTO: 13.3 X10*3/UL (ref 4.4–11.3)

## 2023-12-04 PROCEDURE — 3E033VJ INTRODUCTION OF OTHER HORMONE INTO PERIPHERAL VEIN, PERCUTANEOUS APPROACH: ICD-10-PCS | Performed by: NURSE PRACTITIONER

## 2023-12-04 PROCEDURE — 3700000001 HC GENERAL ANESTHESIA TIME - INITIAL BASE CHARGE: Performed by: ANESTHESIOLOGY

## 2023-12-04 PROCEDURE — 2500000004 HC RX 250 GENERAL PHARMACY W/ HCPCS (ALT 636 FOR OP/ED): Performed by: STUDENT IN AN ORGANIZED HEALTH CARE EDUCATION/TRAINING PROGRAM

## 2023-12-04 PROCEDURE — 85027 COMPLETE CBC AUTOMATED: CPT | Performed by: STUDENT IN AN ORGANIZED HEALTH CARE EDUCATION/TRAINING PROGRAM

## 2023-12-04 PROCEDURE — 3700000002 HC GENERAL ANESTHESIA TIME - EACH INCREMENTAL 1 MINUTE: Performed by: ANESTHESIOLOGY

## 2023-12-04 PROCEDURE — 86780 TREPONEMA PALLIDUM: CPT | Performed by: STUDENT IN AN ORGANIZED HEALTH CARE EDUCATION/TRAINING PROGRAM

## 2023-12-04 PROCEDURE — 2500000004 HC RX 250 GENERAL PHARMACY W/ HCPCS (ALT 636 FOR OP/ED): Performed by: MIDWIFE

## 2023-12-04 PROCEDURE — 36415 COLL VENOUS BLD VENIPUNCTURE: CPT | Performed by: STUDENT IN AN ORGANIZED HEALTH CARE EDUCATION/TRAINING PROGRAM

## 2023-12-04 PROCEDURE — 10907ZC DRAINAGE OF AMNIOTIC FLUID, THERAPEUTIC FROM PRODUCTS OF CONCEPTION, VIA NATURAL OR ARTIFICIAL OPENING: ICD-10-PCS | Performed by: NURSE PRACTITIONER

## 2023-12-04 PROCEDURE — 99285 EMERGENCY DEPT VISIT HI MDM: CPT

## 2023-12-04 PROCEDURE — 86900 BLOOD TYPING SEROLOGIC ABO: CPT | Performed by: STUDENT IN AN ORGANIZED HEALTH CARE EDUCATION/TRAINING PROGRAM

## 2023-12-04 PROCEDURE — 2500000005 HC RX 250 GENERAL PHARMACY W/O HCPCS: Performed by: NURSE ANESTHETIST, CERTIFIED REGISTERED

## 2023-12-04 PROCEDURE — 1120000001 HC OB PRIVATE ROOM DAILY

## 2023-12-04 PROCEDURE — 01967 NEURAXL LBR ANES VAG DLVR: CPT | Performed by: ANESTHESIOLOGIST ASSISTANT

## 2023-12-04 PROCEDURE — 01967 NEURAXL LBR ANES VAG DLVR: CPT | Performed by: ANESTHESIOLOGY

## 2023-12-04 RX ORDER — TERBUTALINE SULFATE 1 MG/ML
0.25 INJECTION SUBCUTANEOUS ONCE AS NEEDED
Status: DISCONTINUED | OUTPATIENT
Start: 2023-12-04 | End: 2023-12-05

## 2023-12-04 RX ORDER — NALBUPHINE HYDROCHLORIDE 10 MG/ML
10 INJECTION, SOLUTION INTRAMUSCULAR; INTRAVENOUS; SUBCUTANEOUS
Status: DISCONTINUED | OUTPATIENT
Start: 2023-12-04 | End: 2023-12-05

## 2023-12-04 RX ORDER — LIDOCAINE HYDROCHLORIDE 10 MG/ML
30 INJECTION INFILTRATION; PERINEURAL ONCE AS NEEDED
Status: DISCONTINUED | OUTPATIENT
Start: 2023-12-04 | End: 2023-12-05

## 2023-12-04 RX ORDER — LABETALOL HYDROCHLORIDE 5 MG/ML
20 INJECTION, SOLUTION INTRAVENOUS ONCE AS NEEDED
Status: DISCONTINUED | OUTPATIENT
Start: 2023-12-04 | End: 2023-12-05

## 2023-12-04 RX ORDER — METOCLOPRAMIDE HYDROCHLORIDE 5 MG/ML
10 INJECTION INTRAMUSCULAR; INTRAVENOUS EVERY 6 HOURS PRN
Status: DISCONTINUED | OUTPATIENT
Start: 2023-12-04 | End: 2023-12-05

## 2023-12-04 RX ORDER — ONDANSETRON HYDROCHLORIDE 2 MG/ML
4 INJECTION, SOLUTION INTRAVENOUS EVERY 6 HOURS PRN
Status: DISCONTINUED | OUTPATIENT
Start: 2023-12-04 | End: 2023-12-05

## 2023-12-04 RX ORDER — OXYTOCIN/0.9 % SODIUM CHLORIDE 30/500 ML
60 PLASTIC BAG, INJECTION (ML) INTRAVENOUS ONCE AS NEEDED
Status: DISCONTINUED | OUTPATIENT
Start: 2023-12-04 | End: 2023-12-05

## 2023-12-04 RX ORDER — METHYLERGONOVINE MALEATE 0.2 MG/ML
0.2 INJECTION INTRAVENOUS ONCE AS NEEDED
Status: DISCONTINUED | OUTPATIENT
Start: 2023-12-04 | End: 2023-12-05

## 2023-12-04 RX ORDER — TRANEXAMIC ACID 100 MG/ML
1000 INJECTION, SOLUTION INTRAVENOUS ONCE AS NEEDED
Status: DISCONTINUED | OUTPATIENT
Start: 2023-12-04 | End: 2023-12-05

## 2023-12-04 RX ORDER — OXYTOCIN 10 [USP'U]/ML
10 INJECTION, SOLUTION INTRAMUSCULAR; INTRAVENOUS ONCE AS NEEDED
Status: DISCONTINUED | OUTPATIENT
Start: 2023-12-04 | End: 2023-12-05

## 2023-12-04 RX ORDER — FENTANYL/BUPIVACAINE/NS/PF 2MCG/ML-.1
PLASTIC BAG, INJECTION (ML) INJECTION AS NEEDED
Status: DISCONTINUED | OUTPATIENT
Start: 2023-12-04 | End: 2023-12-05

## 2023-12-04 RX ORDER — METOCLOPRAMIDE 10 MG/1
10 TABLET ORAL EVERY 6 HOURS PRN
Status: DISCONTINUED | OUTPATIENT
Start: 2023-12-04 | End: 2023-12-05

## 2023-12-04 RX ORDER — FENTANYL/BUPIVACAINE/NS/PF 2MCG/ML-.1
PLASTIC BAG, INJECTION (ML) INJECTION CONTINUOUS PRN
Status: DISCONTINUED | OUTPATIENT
Start: 2023-12-04 | End: 2023-12-05

## 2023-12-04 RX ORDER — SODIUM CHLORIDE, SODIUM LACTATE, POTASSIUM CHLORIDE, CALCIUM CHLORIDE 600; 310; 30; 20 MG/100ML; MG/100ML; MG/100ML; MG/100ML
125 INJECTION, SOLUTION INTRAVENOUS CONTINUOUS
Status: DISCONTINUED | OUTPATIENT
Start: 2023-12-04 | End: 2023-12-05

## 2023-12-04 RX ORDER — HYDRALAZINE HYDROCHLORIDE 20 MG/ML
5 INJECTION INTRAMUSCULAR; INTRAVENOUS ONCE AS NEEDED
Status: DISCONTINUED | OUTPATIENT
Start: 2023-12-04 | End: 2023-12-05

## 2023-12-04 RX ORDER — OXYTOCIN/0.9 % SODIUM CHLORIDE 30/500 ML
2-30 PLASTIC BAG, INJECTION (ML) INTRAVENOUS CONTINUOUS
Status: DISCONTINUED | OUTPATIENT
Start: 2023-12-04 | End: 2023-12-05

## 2023-12-04 RX ORDER — LOPERAMIDE HYDROCHLORIDE 2 MG/1
4 CAPSULE ORAL EVERY 2 HOUR PRN
Status: DISCONTINUED | OUTPATIENT
Start: 2023-12-04 | End: 2023-12-05

## 2023-12-04 RX ORDER — MISOPROSTOL 200 UG/1
800 TABLET ORAL ONCE AS NEEDED
Status: DISCONTINUED | OUTPATIENT
Start: 2023-12-04 | End: 2023-12-05

## 2023-12-04 RX ORDER — ONDANSETRON 4 MG/1
4 TABLET, FILM COATED ORAL EVERY 6 HOURS PRN
Status: DISCONTINUED | OUTPATIENT
Start: 2023-12-04 | End: 2023-12-05

## 2023-12-04 RX ORDER — BUTORPHANOL TARTRATE 1 MG/ML
1 INJECTION INTRAMUSCULAR; INTRAVENOUS EVERY 10 MIN PRN
Status: DISCONTINUED | OUTPATIENT
Start: 2023-12-04 | End: 2023-12-05

## 2023-12-04 RX ORDER — NIFEDIPINE 10 MG/1
10 CAPSULE ORAL ONCE AS NEEDED
Status: DISCONTINUED | OUTPATIENT
Start: 2023-12-04 | End: 2023-12-05

## 2023-12-04 RX ADMIN — SODIUM CHLORIDE, POTASSIUM CHLORIDE, SODIUM LACTATE AND CALCIUM CHLORIDE 125 ML/HR: 600; 310; 30; 20 INJECTION, SOLUTION INTRAVENOUS at 22:21

## 2023-12-04 RX ADMIN — Medication 14 ML/HR: at 23:17

## 2023-12-04 RX ADMIN — Medication 14 ML/HR: at 14:49

## 2023-12-04 RX ADMIN — Medication 2 MILLI-UNITS/MIN: at 13:50

## 2023-12-04 RX ADMIN — Medication 5 ML: at 14:49

## 2023-12-04 RX ADMIN — Medication 5 ML: at 14:47

## 2023-12-04 RX ADMIN — SODIUM CHLORIDE, POTASSIUM CHLORIDE, SODIUM LACTATE AND CALCIUM CHLORIDE 500 ML: 600; 310; 30; 20 INJECTION, SOLUTION INTRAVENOUS at 17:47

## 2023-12-04 RX ADMIN — SODIUM CHLORIDE, POTASSIUM CHLORIDE, SODIUM LACTATE AND CALCIUM CHLORIDE 500 ML: 600; 310; 30; 20 INJECTION, SOLUTION INTRAVENOUS at 14:21

## 2023-12-04 SDOH — SOCIAL STABILITY: SOCIAL INSECURITY: DO YOU FEEL ANYONE HAS EXPLOITED OR TAKEN ADVANTAGE OF YOU FINANCIALLY OR OF YOUR PERSONAL PROPERTY?: NO

## 2023-12-04 SDOH — SOCIAL STABILITY: SOCIAL INSECURITY: DOES ANYONE TRY TO KEEP YOU FROM HAVING/CONTACTING OTHER FRIENDS OR DOING THINGS OUTSIDE YOUR HOME?: NO

## 2023-12-04 SDOH — HEALTH STABILITY: MENTAL HEALTH: NON-SPECIFIC ACTIVE SUICIDAL THOUGHTS (PAST 1 MONTH): NO

## 2023-12-04 SDOH — SOCIAL STABILITY: SOCIAL INSECURITY: HAS ANYONE EVER THREATENED TO HURT YOUR FAMILY OR YOUR PETS?: NO

## 2023-12-04 SDOH — HEALTH STABILITY: MENTAL HEALTH: HAVE YOU USED ANY SUBSTANCES (CANABIS, COCAINE, HEROIN, HALLUCINOGENS, INHALANTS, ETC.) IN THE PAST 12 MONTHS?: NO

## 2023-12-04 SDOH — SOCIAL STABILITY: SOCIAL INSECURITY: ARE YOU OR HAVE YOU BEEN THREATENED OR ABUSED PHYSICALLY, EMOTIONALLY, OR SEXUALLY BY ANYONE?: NO

## 2023-12-04 SDOH — HEALTH STABILITY: MENTAL HEALTH: HAVE YOU USED ANY PRESCRIPTION DRUGS OTHER THAN PRESCRIBED IN THE PAST 12 MONTHS?: NO

## 2023-12-04 SDOH — ECONOMIC STABILITY: HOUSING INSECURITY: DO YOU FEEL UNSAFE GOING BACK TO THE PLACE WHERE YOU ARE LIVING?: NO

## 2023-12-04 SDOH — HEALTH STABILITY: MENTAL HEALTH: CURRENT SMOKER: 0

## 2023-12-04 SDOH — SOCIAL STABILITY: SOCIAL INSECURITY: VERBAL ABUSE: DENIES

## 2023-12-04 SDOH — HEALTH STABILITY: MENTAL HEALTH: SUICIDAL BEHAVIOR (LIFETIME): NO

## 2023-12-04 SDOH — HEALTH STABILITY: MENTAL HEALTH: WERE YOU ABLE TO COMPLETE ALL THE BEHAVIORAL HEALTH SCREENINGS?: YES

## 2023-12-04 SDOH — SOCIAL STABILITY: SOCIAL INSECURITY: PHYSICAL ABUSE: DENIES

## 2023-12-04 SDOH — SOCIAL STABILITY: SOCIAL INSECURITY: ABUSE SCREEN: ADULT

## 2023-12-04 SDOH — SOCIAL STABILITY: SOCIAL INSECURITY: HAVE YOU HAD THOUGHTS OF HARMING ANYONE ELSE?: NO

## 2023-12-04 SDOH — SOCIAL STABILITY: SOCIAL INSECURITY: ARE THERE ANY APPARENT SIGNS OF INJURIES/BEHAVIORS THAT COULD BE RELATED TO ABUSE/NEGLECT?: NO

## 2023-12-04 SDOH — HEALTH STABILITY: MENTAL HEALTH: WISH TO BE DEAD (PAST 1 MONTH): NO

## 2023-12-04 ASSESSMENT — PAIN SCALES - GENERAL
PAINLEVEL_OUTOF10: 0 - NO PAIN
PAINLEVEL_OUTOF10: 10 - WORST POSSIBLE PAIN
PAINLEVEL_OUTOF10: 0 - NO PAIN
PAINLEVEL_OUTOF10: 0 - NO PAIN
PAINLEVEL_OUTOF10: 5 - MODERATE PAIN
PAINLEVEL_OUTOF10: 2
PAINLEVEL_OUTOF10: 0 - NO PAIN

## 2023-12-04 ASSESSMENT — PATIENT HEALTH QUESTIONNAIRE - PHQ9
SUM OF ALL RESPONSES TO PHQ9 QUESTIONS 1 & 2: 0
2. FEELING DOWN, DEPRESSED OR HOPELESS: NOT AT ALL
1. LITTLE INTEREST OR PLEASURE IN DOING THINGS: NOT AT ALL

## 2023-12-04 ASSESSMENT — LIFESTYLE VARIABLES
SKIP TO QUESTIONS 9-10: 1
HOW OFTEN DO YOU HAVE 6 OR MORE DRINKS ON ONE OCCASION: NEVER
AUDIT-C TOTAL SCORE: 0
AUDIT-C TOTAL SCORE: 0
HOW OFTEN DO YOU HAVE A DRINK CONTAINING ALCOHOL: NEVER
HOW MANY STANDARD DRINKS CONTAINING ALCOHOL DO YOU HAVE ON A TYPICAL DAY: PATIENT DOES NOT DRINK

## 2023-12-04 ASSESSMENT — COLUMBIA-SUICIDE SEVERITY RATING SCALE - C-SSRS
1. IN THE PAST MONTH, HAVE YOU WISHED YOU WERE DEAD OR WISHED YOU COULD GO TO SLEEP AND NOT WAKE UP?: NO
6. HAVE YOU EVER DONE ANYTHING, STARTED TO DO ANYTHING, OR PREPARED TO DO ANYTHING TO END YOUR LIFE?: NO
2. HAVE YOU ACTUALLY HAD ANY THOUGHTS OF KILLING YOURSELF?: NO

## 2023-12-04 NOTE — H&P
Obstetrical Admission History and Physical     Rizwan Espana is a 23 y.o.  at 39w2d. BRONSON: 2023, by Last Menstrual Period. Estimated fetal weight: 8#. She has had prenatal care with Jean Soria CNM .    Chief Complaint: Induction of Labor    Assessment/Plan    24 y/o  at 39.2 weeks  Favorable cervix at term  Gbs neg  Cat I  Asthma  H/o HSV on prophylaxis    P: Admit to labor and delivery       OB admission labs      Monitor vital signs per unit protocol      Encourage frequent position changes      Regular diet until pitocin or epidural      Pain management per patient request      Continue assessment of maternal and fetal well-being      Recheck as clinically indicted by maternal or fetal status      Anticipate SVB       _Jyotsna____ aware of admission    EDGAR Bronson     Principal Problem:    39 weeks gestation of pregnancy      Pregnancy Problems (from 23 to present)       Problem Noted Resolved    39 weeks gestation of pregnancy 2023 by Jamar Morales MD No    Priority:  Medium      Encounter for supervision of normal pregnancy, antepartum 2023 by Nyasia Dewitt MD No    Priority:  Medium      Overview Signed 2023  4:57 PM by Nyasia Dewitt MD     Dating:   [x] Initial BMI: 29  [x] Prenatal Labs: 2023  [x] Aneuploidy Screening:  rrr cfDNA 23  [x] Baby ASA: continue   [x] Anatomy US: . - WNL  [x] 1hr GCT at 24-28wks: Abnormal 1h, normal 3h gtt  [x] Tdap (27-36wks): Received 2023  [x] Flu Shot: Declined 2023  [] COVID vaccine:   [x] Rhogam (if Rh neg): Not indicated  [x] GBS at 36 wks: Negative as of   [x] Breastfeeding- Desires to breastfeed  [x] PPBC: Desires patch. Counseled on contraindications, including elevated BMI. Additionally discussed elevated VTE risk and potentially decreased milk supply in the postpartum period with estrogen-containing BC. States she plans to pursue abstinence. Continue to address  at subsequent visits.   [x] 39 weeks discussion of IOL vs. Expectant management: Desires 39wk IOL. Will message for scheduling. Reviewed IOL process  [x] Mode of delivery:  Desires VB. Discussed potential need for CS for maternal/fetal indications or if experiencing active HSV outbreak          HSV infection 10/16/2023 by MIKE Hanna-CNP No    Priority:  Medium      Overview Signed 11/16/2023  9:45 AM by EDGAR Scott     HSV prophylaxis starting 36wk               Options for delivery have been discussed with the patient and she elects for an induction of labor.  Cervical ripening with cytotec, cervidil, other prostaglandin agents has been discussed.  Induction of labor with pitocin, amniotomy, cytotec, and cervical balloon have been discussed in detail. The risks, benefits, complications, alternatives, expected outcomes, potential problems during recuperation and recovery, and the risks of not performing the procedure were discussed with the patient. The patient stated understanding that the risks of delivery include, but are not limited to: death; reaction to medications; injury to bowel, bladder, ureters, uterus, cervix, vagina, and other pelvic and abdominal structures, infection; blood loss and possible need for transfusion; and potential need for surgery, including hysterectomy. The risks of injury to the infant during delivery were also discussed. All questions were answered. There was concurrence with the planned procedure, and the patient wanted to proceed.    Admit to inpatient status. I anticipate that this patient will require a stay exceeding at least 2 midnights for delivery and postpartum.  Induction of labor.  Management of pregnancy complications, as indicated.    Subjective   Good fetal movement. Denies vaginal bleeding., Denies leaking of fluid.       Reason for Induction of Labor:  Pregnancy at 39 weeks or greater for induction         Obstetrical History   OB  "History    Para Term  AB Living   1 0 0 0 0 0   SAB IAB Ectopic Multiple Live Births   0 0 0 0 0      # Outcome Date GA Lbr Sergio/2nd Weight Sex Delivery Anes PTL Lv   1 Current                Past Medical History  Past Medical History:   Diagnosis Date    Asthma     Herpes         Past Surgical History   No past surgical history on file.    Social History  Social History     Tobacco Use    Smoking status: Never    Smokeless tobacco: Never   Substance Use Topics    Alcohol use: Not Currently     Substance and Sexual Activity   Drug Use Never       Allergies  Patient has no known allergies.     Medications  Medications Prior to Admission   Medication Sig Dispense Refill Last Dose    aspirin 81 mg EC tablet Take 2 tablets (162 mg) by mouth once daily.       guaiFENesin (Mucinex) 600 mg 12 hr tablet Take 1 tablet (600 mg) by mouth 2 times a day as needed for cough. Do not crush, chew, or split. 8 tablet 0     Prenatal Multi  mg-mcg tablet Take 1 tablet by mouth once daily.       sodium chloride (Ocean) 0.65 % nasal spray Administer 1 spray into each nostril if needed for congestion. 30 mL 0     valACYclovir (Valtrex) 500 mg tablet Take 1 tablet (500 mg) by mouth 2 times a day. 60 tablet 0     Ventolin HFA 90 mcg/actuation inhaler Inhale 2 puffs every 4 hours if needed.          Objective    Last Vitals  Temp Pulse Resp BP MAP O2 Sat   36.7 °C (98.1 °F) 84 20 121/80   99 %     Physical Examination  Heent - wnl  Abd - gravid NT +FHR            EFW 8#  Pelvic:  SSE- neg for HSV lesions on labia, vagina and cervix              Cervix- 3/70/-2  EXT reflexes 2+/2+    Lab Review  Labs in chart were reviewed.  No results found for: \"ABO\", \"LABRH\", \"ABSCRN\"  Lab Results   Component Value Date    WBC 13.3 (H) 2023    HGB 13.5 2023    HCT 44.1 2023     2023         "

## 2023-12-04 NOTE — PROGRESS NOTES
Pitocin turned off at 1656 for repetitive late decels   mod variability- late decels noted until 1710.    Will restart pitocin when clinically indicated    Maria Luz Melgar, MIKE-LARRYM

## 2023-12-04 NOTE — CARE PLAN
The patient's goals for the shift include manage pain    The clinical goals for the shift include remain free from falls

## 2023-12-04 NOTE — PROGRESS NOTES
S: pt without complaints    O: Cervical Exam: 3/70/-2 at 1300  FHR     Baseline: 150     Variability:mod     Accels:present     Decels:absent     Category:1  TOCO:q 2-3  Oxytocin:2  Membrane status: Intact     Color of fluid:clear    A: IUP at  39.2 weeks      latent labor     Cat 1    GBS neg      P: Continue assessment of maternal and fetal well-being      Continue to evaluate the progress of labor      Continue pitocin per protocol      Anticipate         aware of plan      EDGAR Lauren

## 2023-12-04 NOTE — ANESTHESIA PROCEDURE NOTES
Epidural Block    Reason for block: labor analgesia  Staffing  Performed: [facility]   Authorized by: Alex Dickey MD    Performed by: SOLEDAD Fontanez    Preanesthetic Checklist  Completed: patient identified, IV checked, site marked, risks and benefits discussed, surgical consent, monitors and equipment checked, pre-op evaluation, timeout performed and sterile techniques followed  Block Timeout  RN/Licensed healthcare professional reads aloud to the Anesthesia provider and entire team: Patient identity, procedure with side and site, patient position, and as applicable the availability of implants/special equipment/special requirements.    Timeout performed at: 12/4/2023 2:32 PM  Block Placement  Patient position: sitting  Prep: ChloraPrep  Sterility prep: cap, drape, gloves and mask  Sedation level: no sedation  Patient monitoring: heart rate, continuous pulse oximetry and blood pressure  Approach: midline  Local numbing: lidocaine 1% to skin and subcutaneous tissues  Vertebral space: lumbar  Lumbar location: L3-L4  Epidural  Loss of resistance technique: saline  Guidance: landmark technique        Needle  Needle type: Tuohy   Needle gauge: 17  Needle length: 9 cm  Needle insertion depth: 7 cm  Catheter type: end hole  Catheter size: 20 G  Catheter at skin depth: 11 cm  Catheter securement method: clear occlusive dressing    Test dose: lidocaine 1.5% with epinephrine 1-to-200,000  Test dose given at 12/4/2023 2:44 PM  Test dose: lidocaine 1.5% with epinephrine 1-to-200,000  Test dose result: no positive test dose    PCEA  Medication concentration used: 0.044% Bupivacaine with 1.25 mcg/mL Fentanyl and 1:013900 Epinephrine  Dose (mL): 10  Lockout (minutes): 15  1-Hour Limit (boluses/hr): 4  Basal Rate: 14        Assessment  Block outcome: patient comfortable  Number of attempts: 1  Events: no positive test dose  Procedure assessment: patient tolerated procedure well with no immediate  complications

## 2023-12-04 NOTE — ANESTHESIA PREPROCEDURE EVALUATION
Patient: Rizwan Espana    Evaluation Method: In-person visit    Procedure Information    Date: 12/04/23  Procedure: Labor Analgesia         Relevant Problems   Cardiovascular   (+) Premature beats      Endocrine   (+) Obesity      Pulmonary   (+) Mild persistent asthma      Infectious Disease   (+) HSV infection       Clinical information reviewed:    Allergies  Meds               NPO Detail:  No data recorded     OB/GYN     Physical Exam    Airway  Mallampati: II  TM distance: >3 FB  Neck ROM: full     Cardiovascular - normal exam  Rhythm: regular  Rate: normal     Dental    Pulmonary - normal exam  Breath sounds clear to auscultation     Abdominal - normal exam             Anesthesia Plan    ASA 3     epidural     The patient is not a current smoker.    Anesthetic plan and risks discussed with patient.    Plan discussed with attending and CRNA.

## 2023-12-04 NOTE — ED TRIAGE NOTES
Pt is 39 wks and 2 days pregnant  contractions started at 0700 this AM and are 3 min apart. Due date 23.

## 2023-12-04 NOTE — PROGRESS NOTES
S:pt comfortable with epidural    O: Cervical Exam: /0  FHR     Baseline: 150     Variability:mod     Accels:present     Decels:late x 2     Category:II  TOCO:  Oxytocin:  Membrane status     Color of fluid:    A: IUP at  39.2 weeks      latent labor     Cat II    GBS Neg      P: Continue assessment of maternal and fetal well-being      Continue to evaluate the progress of labor      Continue pitocin per protocol      Anticipate         aware of plan      MIKE Lauren-DENIA

## 2023-12-04 NOTE — ANESTHESIA PREPROCEDURE EVALUATION
Patient: Rizwan Espana    Evaluation Method: In-person visit    Procedure Information    Date: 12/04/23  Procedure: Labor Consult         Relevant Problems   Anesthesia   No history of complications History of general anesthesia      Cardiovascular   (+) Premature beats      Endocrine   (+) Obesity      Neuro/Psych (within normal limits)      Pulmonary   (+) Mild persistent asthma      Hematology (within normal limits)      Infectious Disease   (+) HSV infection       Clinical information reviewed:    Allergies  Meds               NPO Detail:  No data recorded     OB/Gyn Evaluation    Present Pregnancy    Patient is pregnant now.   Obstetric History                Physical Exam    Airway  Mallampati: IV  TM distance: <3 FB  Neck ROM: full     Cardiovascular   Rhythm: regular  Rate: normal     Dental - normal exam     Pulmonary - normal exam  Breath sounds clear to auscultation     Abdominal            Anesthesia Plan    ASA 3     epidural     The patient is not a current smoker.    Anesthetic plan and risks discussed with patient.  Use of blood products discussed with patient who.    Plan discussed with resident and attending.

## 2023-12-05 PROBLEM — Z3A.39 39 WEEKS GESTATION OF PREGNANCY (HHS-HCC): Status: RESOLVED | Noted: 2023-12-04 | Resolved: 2023-12-05

## 2023-12-05 PROBLEM — Z34.90 ENCOUNTER FOR SUPERVISION OF NORMAL PREGNANCY, ANTEPARTUM (HHS-HCC): Status: RESOLVED | Noted: 2023-11-21 | Resolved: 2023-12-05

## 2023-12-05 LAB
ERYTHROCYTE [DISTWIDTH] IN BLOOD BY AUTOMATED COUNT: 13 % (ref 11.5–14.5)
HCT VFR BLD AUTO: 34.2 % (ref 36–46)
HGB BLD-MCNC: 11.2 G/DL (ref 12–16)
MCH RBC QN AUTO: 28.4 PG (ref 26–34)
MCHC RBC AUTO-ENTMCNC: 32.7 G/DL (ref 32–36)
MCV RBC AUTO: 87 FL (ref 80–100)
NRBC BLD-RTO: 0 /100 WBCS (ref 0–0)
PLATELET # BLD AUTO: 200 X10*3/UL (ref 150–450)
RBC # BLD AUTO: 3.94 X10*6/UL (ref 4–5.2)
WBC # BLD AUTO: 18.5 X10*3/UL (ref 4.4–11.3)

## 2023-12-05 PROCEDURE — 36415 COLL VENOUS BLD VENIPUNCTURE: CPT

## 2023-12-05 PROCEDURE — 1100000001 HC PRIVATE ROOM DAILY

## 2023-12-05 PROCEDURE — 2500000005 HC RX 250 GENERAL PHARMACY W/O HCPCS: Performed by: ANESTHESIOLOGIST ASSISTANT

## 2023-12-05 PROCEDURE — 0KQM0ZZ REPAIR PERINEUM MUSCLE, OPEN APPROACH: ICD-10-PCS

## 2023-12-05 PROCEDURE — 51701 INSERT BLADDER CATHETER: CPT

## 2023-12-05 PROCEDURE — 2500000001 HC RX 250 WO HCPCS SELF ADMINISTERED DRUGS (ALT 637 FOR MEDICARE OP)

## 2023-12-05 PROCEDURE — 0UQMXZZ REPAIR VULVA, EXTERNAL APPROACH: ICD-10-PCS

## 2023-12-05 PROCEDURE — 59410 OBSTETRICAL CARE: CPT

## 2023-12-05 PROCEDURE — 59050 FETAL MONITOR W/REPORT: CPT

## 2023-12-05 PROCEDURE — 59409 OBSTETRICAL CARE: CPT

## 2023-12-05 PROCEDURE — 7210000002 HC LABOR PER HOUR

## 2023-12-05 PROCEDURE — 2500000004 HC RX 250 GENERAL PHARMACY W/ HCPCS (ALT 636 FOR OP/ED): Performed by: STUDENT IN AN ORGANIZED HEALTH CARE EDUCATION/TRAINING PROGRAM

## 2023-12-05 PROCEDURE — 85027 COMPLETE CBC AUTOMATED: CPT

## 2023-12-05 RX ORDER — METHYLERGONOVINE MALEATE 0.2 MG/ML
0.2 INJECTION INTRAVENOUS ONCE AS NEEDED
Status: DISCONTINUED | OUTPATIENT
Start: 2023-12-05 | End: 2023-12-07 | Stop reason: HOSPADM

## 2023-12-05 RX ORDER — OXYTOCIN/0.9 % SODIUM CHLORIDE 30/500 ML
60 PLASTIC BAG, INJECTION (ML) INTRAVENOUS ONCE AS NEEDED
Status: DISCONTINUED | OUTPATIENT
Start: 2023-12-05 | End: 2023-12-07 | Stop reason: HOSPADM

## 2023-12-05 RX ORDER — CARBOPROST TROMETHAMINE 250 UG/ML
250 INJECTION, SOLUTION INTRAMUSCULAR ONCE AS NEEDED
Status: DISCONTINUED | OUTPATIENT
Start: 2023-12-05 | End: 2023-12-07 | Stop reason: HOSPADM

## 2023-12-05 RX ORDER — BISACODYL 10 MG/1
10 SUPPOSITORY RECTAL DAILY PRN
Status: DISCONTINUED | OUTPATIENT
Start: 2023-12-05 | End: 2023-12-07 | Stop reason: HOSPADM

## 2023-12-05 RX ORDER — LOPERAMIDE HYDROCHLORIDE 2 MG/1
4 CAPSULE ORAL EVERY 2 HOUR PRN
Status: DISCONTINUED | OUTPATIENT
Start: 2023-12-05 | End: 2023-12-07 | Stop reason: HOSPADM

## 2023-12-05 RX ORDER — TRANEXAMIC ACID 100 MG/ML
1000 INJECTION, SOLUTION INTRAVENOUS ONCE AS NEEDED
Status: DISCONTINUED | OUTPATIENT
Start: 2023-12-05 | End: 2023-12-07 | Stop reason: HOSPADM

## 2023-12-05 RX ORDER — MISOPROSTOL 200 UG/1
800 TABLET ORAL ONCE AS NEEDED
Status: DISCONTINUED | OUTPATIENT
Start: 2023-12-05 | End: 2023-12-07 | Stop reason: HOSPADM

## 2023-12-05 RX ORDER — DIPHENHYDRAMINE HCL 25 MG
25 CAPSULE ORAL EVERY 6 HOURS PRN
Status: DISCONTINUED | OUTPATIENT
Start: 2023-12-05 | End: 2023-12-07 | Stop reason: HOSPADM

## 2023-12-05 RX ORDER — OXYTOCIN 10 [USP'U]/ML
10 INJECTION, SOLUTION INTRAMUSCULAR; INTRAVENOUS ONCE AS NEEDED
Status: DISCONTINUED | OUTPATIENT
Start: 2023-12-05 | End: 2023-12-07 | Stop reason: HOSPADM

## 2023-12-05 RX ORDER — ADHESIVE BANDAGE
10 BANDAGE TOPICAL
Status: DISCONTINUED | OUTPATIENT
Start: 2023-12-05 | End: 2023-12-07 | Stop reason: HOSPADM

## 2023-12-05 RX ORDER — ACETAMINOPHEN 325 MG/1
975 TABLET ORAL EVERY 6 HOURS
Status: DISCONTINUED | OUTPATIENT
Start: 2023-12-05 | End: 2023-12-07 | Stop reason: HOSPADM

## 2023-12-05 RX ORDER — DIPHENHYDRAMINE HYDROCHLORIDE 50 MG/ML
25 INJECTION INTRAMUSCULAR; INTRAVENOUS EVERY 6 HOURS PRN
Status: DISCONTINUED | OUTPATIENT
Start: 2023-12-05 | End: 2023-12-07 | Stop reason: HOSPADM

## 2023-12-05 RX ORDER — LIDOCAINE HYDROCHLORIDE 10 MG/ML
INJECTION INFILTRATION; PERINEURAL AS NEEDED
Status: DISCONTINUED | OUTPATIENT
Start: 2023-12-05 | End: 2023-12-05

## 2023-12-05 RX ORDER — HYDRALAZINE HYDROCHLORIDE 20 MG/ML
5 INJECTION INTRAMUSCULAR; INTRAVENOUS ONCE AS NEEDED
Status: DISCONTINUED | OUTPATIENT
Start: 2023-12-05 | End: 2023-12-07 | Stop reason: HOSPADM

## 2023-12-05 RX ORDER — ENOXAPARIN SODIUM 100 MG/ML
40 INJECTION SUBCUTANEOUS EVERY 24 HOURS
Status: DISCONTINUED | OUTPATIENT
Start: 2023-12-05 | End: 2023-12-07 | Stop reason: HOSPADM

## 2023-12-05 RX ORDER — IBUPROFEN 600 MG/1
600 TABLET ORAL EVERY 6 HOURS
Status: DISCONTINUED | OUTPATIENT
Start: 2023-12-05 | End: 2023-12-07 | Stop reason: HOSPADM

## 2023-12-05 RX ORDER — LIDOCAINE 560 MG/1
1 PATCH PERCUTANEOUS; TOPICAL; TRANSDERMAL
Status: DISCONTINUED | OUTPATIENT
Start: 2023-12-05 | End: 2023-12-07 | Stop reason: HOSPADM

## 2023-12-05 RX ORDER — SIMETHICONE 80 MG
80 TABLET,CHEWABLE ORAL 4 TIMES DAILY PRN
Status: DISCONTINUED | OUTPATIENT
Start: 2023-12-05 | End: 2023-12-07 | Stop reason: HOSPADM

## 2023-12-05 RX ORDER — POLYETHYLENE GLYCOL 3350 17 G/17G
17 POWDER, FOR SOLUTION ORAL 2 TIMES DAILY PRN
Status: DISCONTINUED | OUTPATIENT
Start: 2023-12-05 | End: 2023-12-07 | Stop reason: HOSPADM

## 2023-12-05 RX ORDER — ONDANSETRON 4 MG/1
4 TABLET, FILM COATED ORAL EVERY 6 HOURS PRN
Status: DISCONTINUED | OUTPATIENT
Start: 2023-12-05 | End: 2023-12-07 | Stop reason: HOSPADM

## 2023-12-05 RX ORDER — ONDANSETRON HYDROCHLORIDE 2 MG/ML
4 INJECTION, SOLUTION INTRAVENOUS EVERY 6 HOURS PRN
Status: DISCONTINUED | OUTPATIENT
Start: 2023-12-05 | End: 2023-12-07 | Stop reason: HOSPADM

## 2023-12-05 RX ORDER — LABETALOL HYDROCHLORIDE 5 MG/ML
20 INJECTION, SOLUTION INTRAVENOUS ONCE AS NEEDED
Status: DISCONTINUED | OUTPATIENT
Start: 2023-12-05 | End: 2023-12-07 | Stop reason: HOSPADM

## 2023-12-05 RX ORDER — NIFEDIPINE 10 MG/1
10 CAPSULE ORAL ONCE AS NEEDED
Status: DISCONTINUED | OUTPATIENT
Start: 2023-12-05 | End: 2023-12-07 | Stop reason: HOSPADM

## 2023-12-05 RX ADMIN — SODIUM CHLORIDE, POTASSIUM CHLORIDE, SODIUM LACTATE AND CALCIUM CHLORIDE 125 ML/HR: 600; 310; 30; 20 INJECTION, SOLUTION INTRAVENOUS at 03:21

## 2023-12-05 RX ADMIN — IBUPROFEN 600 MG: 600 TABLET ORAL at 14:45

## 2023-12-05 RX ADMIN — IBUPROFEN 600 MG: 600 TABLET ORAL at 20:49

## 2023-12-05 RX ADMIN — ACETAMINOPHEN 975 MG: 325 TABLET ORAL at 14:45

## 2023-12-05 RX ADMIN — LIDOCAINE HYDROCHLORIDE 5 ML: 10 INJECTION, SOLUTION INFILTRATION; PERINEURAL at 01:22

## 2023-12-05 RX ADMIN — IBUPROFEN 600 MG: 600 TABLET ORAL at 08:52

## 2023-12-05 RX ADMIN — ACETAMINOPHEN 975 MG: 325 TABLET ORAL at 20:49

## 2023-12-05 ASSESSMENT — PAIN SCALES - GENERAL
PAINLEVEL_OUTOF10: 0 - NO PAIN
PAINLEVEL_OUTOF10: 10 - WORST POSSIBLE PAIN
PAINLEVEL_OUTOF10: 0 - NO PAIN
PAINLEVEL_OUTOF10: 7
PAINLEVEL_OUTOF10: 8
PAINLEVEL_OUTOF10: 0 - NO PAIN

## 2023-12-05 ASSESSMENT — PAIN DESCRIPTION - DESCRIPTORS
DESCRIPTORS: CRAMPING
DESCRIPTORS: CRAMPING

## 2023-12-05 ASSESSMENT — ACTIVITIES OF DAILY LIVING (ADL): LACK_OF_TRANSPORTATION: NO

## 2023-12-05 NOTE — DISCHARGE INSTRUCTIONS

## 2023-12-05 NOTE — PROGRESS NOTES
Assessment    23 y.o.  at 39w3d  FHT Category 1  Active labor  GBS neg    Plan    Expectant management  Maternal repositioning  Continue assessment of maternal and fetal wellbeing  Recheck as clinically indicated by maternal or fetal status  Anticipate NSVB    EDGAR Tate    Subjective:  Rizwan Espana is starting to feel some increased pressure with contractions.     Objective:  Fetal Monitoring      Baseline FHR: 130 per minute  Variability: moderate  Accelerations: yes  Decelerations: variable x1  TOCO: regular, every 3 minutes    Cervical Exam:  7 cm dilated, 100 effaced, -1 station    Membrane status: ruptured, clear fluid    Pitocin is off    Vitals:    23 2244 23 2259 23 2331 23 0025   BP: 133/69  122/66 126/74   Pulse: 85  86 93   Resp: 17  18    Temp: 36.8 °C (98.2 °F) 36.7 °C (98.1 °F) 37 °C (98.6 °F)    TempSrc: Oral Oral Oral    SpO2: 100%  99%    Weight:       Height:

## 2023-12-05 NOTE — PROGRESS NOTES
Intrapartum Progress Note    Assessment/Plan   Rizwan Epsana is a 23 y.o.  at 39w2d. BRONSON: 2023, by Last Menstrual Period.   Cat 2 FHT  Latent Labor  GBS neg  Asthma  H/o HSV, nsse    - Pitocin is currently off for recurrent late decelerations. Plan to restart per protocol.   - Encourage frequent position changes  - Continuous fetal monitoring  - Pain management per patient request  - Continue assessment of maternal and fetal well-being  - Recheck as clinically indicted by maternal or fetal status  - Will evaluate progress of labor as clinically indicated  - Anticipate SVB      Dr. Quezada aware of patient status.     Stacey Terrazas, APRN-CNM     Principal Problem:    39 weeks gestation of pregnancy    Pregnancy Problems (from 23 to present)       Problem Noted Resolved    39 weeks gestation of pregnancy 2023 by Jamar Morales MD No    Priority:  Medium      Encounter for supervision of normal pregnancy, antepartum 2023 by Nyasia Dewitt MD No    Priority:  Medium      Overview Signed 2023  4:57 PM by Nyasia Dewitt MD     Dating:   [x] Initial BMI: 29  [x] Prenatal Labs: 2023  [x] Aneuploidy Screening:  rrr cfDNA 23  [x] Baby ASA: continue   [x] Anatomy US: . - WNL  [x] 1hr GCT at 24-28wks: Abnormal 1h, normal 3h gtt  [x] Tdap (27-36wks): Received 2023  [x] Flu Shot: Declined 2023  [] COVID vaccine:   [x] Rhogam (if Rh neg): Not indicated  [x] GBS at 36 wks: Negative as of   [x] Breastfeeding- Desires to breastfeed  [x] PPBC: Desires patch. Counseled on contraindications, including elevated BMI. Additionally discussed elevated VTE risk and potentially decreased milk supply in the postpartum period with estrogen-containing BC. States she plans to pursue abstinence. Continue to address at subsequent visits.   [x] 39 weeks discussion of IOL vs. Expectant management: Desires 39wk IOL. Will message for scheduling. Reviewed IOL process  [x]  Mode of delivery:  Desires VB. Discussed potential need for CS for maternal/fetal indications or if experiencing active HSV outbreak          HSV infection 10/16/2023 by Marilee Tellez, APRN-CNP No    Priority:  Medium      Overview Signed 11/16/2023  9:45 AM by MIKE Scott-CNM     HSV prophylaxis starting 36wk                 Subjective   Patient is doing well overall and has support person at bedside. She is coping well with epidural.     Objective   Last Vitals:  Temp Pulse Resp BP MAP Pulse Ox   36.8 °C (98.2 °F) 82 18 108/74   99 %     Vitals Min/Max Last 24 Hours:  Temp  Min: 35.9 °C (96.6 °F)  Max: 36.8 °C (98.2 °F)  Pulse  Min: 80  Max: 95  Resp  Min: 18  Max: 20  BP  Min: 108/74  Max: 138/91    Intake/Output:    Intake/Output Summary (Last 24 hours) at 12/4/2023 1929  Last data filed at 12/4/2023 1830  Gross per 24 hour   Intake --   Output 600 ml   Net -600 ml       Physical Examination:  GENERAL: Examination reveals a, gravid female in no acute distress. She is alert and cooperative.  LUNGS:  normal respiratory effort.  ABDOMEN: soft, gravid, nontender, nondistended, no abnormal masses, no epigastric pain  FHR is 139  , with Accelerations (difficulty tracing FHR for continuous amounts of time, RN remains at bedside intermittently readjusting FHR monitor), and late decelerations. and a Category 2 tracing.    [place] reading:   q2-3 min    Lab Review:  Lab Results   Component Value Date    ABO A 12/04/2023    LABRH POS 12/04/2023    ABSCRN NEG 12/04/2023     Lab Results   Component Value Date    WBC 13.3 (H) 12/04/2023    HGB 13.5 12/04/2023    HCT 44.1 12/04/2023     12/04/2023

## 2023-12-05 NOTE — CARE PLAN
The patient's goals for the shift include manage pain    The clinical goals for the shift include healthy mom and baby

## 2023-12-05 NOTE — PROGRESS NOTES
Pitocin stopped at 2218 for 2 late declarations. Patient is still shay  every 1-3 minutes. Moderate Variability. Plan to recheck for cervical change at 0030 or as clinically indicated. Plan reviewed with Dr. Quezada and Dr. Mcmanus.     EDGAR Tate

## 2023-12-05 NOTE — L&D DELIVERY NOTE
OB Vaginal Delivery Note    2023  Rizwan Espana  23 y.o.     Review the Delivery Report for details.     Gestational Age: 39w3d  /Para:   Labor Complications: Shoulder Dystocia   Estimated Blood Loss:   Delivery Blood Loss  23 1620 - 23 0533      None            Quantitative Blood Loss:    Delivery Type: Vaginal, Spontaneous   ROM to Delivery Time: 11h 46m   Sex: Female  [place] Weight: 3160 g    1 Minute 5 Minute 10 Minute   Apgar Totals: 8   9          Curly Espana [[id number]]      Delivery Providers    Delivering clinician: EDGAR Tate   Provider Role    Barbara Castellano, RN Delivery Nurse    Maria Luz Napoles RN Nursery Nurse    EDGAR Tate Midwife               Delivery Details:  Rizwan Espana, a 23 y.o.  female delivered a viable Female infant with Apgars of 8  and 9 . Patient was fully dilated and pushing after 14  hours 46  minutes in active labor. The patient was put in the dorsal lithotomy position. Delivery was via Vaginal, Spontaneous  to a sterile field under Epidural  anesthesia. Infant delivered in Vertex        position. Anterior and posterior shoulders delivered without difficulty. The cord was clamped twice, cut and 3 vessels  were noted. Cord blood was obtained in routine fashion with the following disposition: Refrigerator .  Cord complications were Nuchal   around the   with 1  loops, which were easily reduced . Intact  placenta delivered at 2023  4:23 AM . Placental disposition was discarded . Fundal massage performed and fundus found to be firm. Perineum, vagina, cervix were inspected, and the following lacerations were noted:   Curly Espana [[id number]]      Lacerations    Episiotomy: None  Perineal laceration: 2nd  Perineal laceration repaired?: Yes  Periurethral laceration?: Yes  Periurethral laceration location: right  Periurethral laceration repaired?: No  Repair suture: 3-0 Synthetic Suture             Any lacerations were repaired in the usual fashion using 3-0 Synthetic Suture . Excellent hemostasis was noted. Needle count correct. Infant and patient in delivery room in good and stable condition.     MIKE Tate-DENIA

## 2023-12-05 NOTE — PROGRESS NOTES
S: called to pt. Room per RN at [id number]. Pit was stopped for 3rd time due to decel lasting 2 min.   Patient comfortable with epidural.     O:   Baseline: 150   Variability: Moderate  Accels: Present  Decels: absent at this time.   Cat 1  [place]: 1-3 min  Oxytocin: off    A:   23 y.o.  at 39w2d  FHT Category 1  Latent labor  GBS neg    P:   Plan to restart pitocin at this time.  Discussed with patient labor progress expectations and the complications of turing the pit off for a third time. Discussed with patient the possibility of C/S if unable to make change w/o pitocin and if baby doesn't tolerate pitocin. Patient verbalized plan back to me and understands.   Continue assessment of maternal and fetal well-being  Continue to evaluate the progress of labor  Anticipate Active phase of labor.   Strip and plan of care reviewed with Dr. Marietta Terrazas, MIKE-DENIA

## 2023-12-05 NOTE — PROGRESS NOTES
Assessment    23 y.o.  at 39w2d  FHT Category 2  Latent labor  GBS neg  Asthma   H/O HSV, nsse    Plan      Restart pitocin per protocol  Continue assessment of maternal and fetal wellbeing  Recheck as clinically indicated by maternal or fetal status  Fetal strip and care plan reviewed with Dr. Marietta Terrazas, APRN-CNM    Subjective:  Rizwan Espana is coping well with contractions and is comfortable with the epidural. Support person at bedside.     Objective:  Fetal Monitoring      Baseline FHR: 135 per minute  Variability: moderate  Accelerations: yes  Decelerations: variable  TOCO: irregular every 1-3 minutes.     Cervical Exam:  6 cm dilated, 100 effaced, -1 station    Membrane status: ruptured, clear fluid    Pitocin is off    Vitals:    23 1734 23 1834 23 1918 23 2030   BP: 124/82 121/75 108/74 123/76   Pulse: 83 84 82 84   Resp: 20 18 17 17   Temp: 36.7 °C (98.1 °F) 36.8 °C (98.2 °F) 36.4 °C (97.5 °F) 37 °C (98.6 °F)   TempSrc: Temporal Temporal Oral Oral   SpO2: 99% 99%  98%   Weight:       Height:

## 2023-12-06 PROCEDURE — 2500000004 HC RX 250 GENERAL PHARMACY W/ HCPCS (ALT 636 FOR OP/ED)

## 2023-12-06 PROCEDURE — 1100000001 HC PRIVATE ROOM DAILY

## 2023-12-06 PROCEDURE — 2500000001 HC RX 250 WO HCPCS SELF ADMINISTERED DRUGS (ALT 637 FOR MEDICARE OP)

## 2023-12-06 RX ADMIN — IBUPROFEN 600 MG: 600 TABLET ORAL at 14:42

## 2023-12-06 RX ADMIN — ACETAMINOPHEN 975 MG: 325 TABLET ORAL at 02:26

## 2023-12-06 RX ADMIN — ACETAMINOPHEN 975 MG: 325 TABLET ORAL at 14:42

## 2023-12-06 RX ADMIN — DIPHENHYDRAMINE HYDROCHLORIDE 25 MG: 50 INJECTION INTRAMUSCULAR; INTRAVENOUS at 03:46

## 2023-12-06 RX ADMIN — IBUPROFEN 600 MG: 600 TABLET ORAL at 02:26

## 2023-12-06 RX ADMIN — ACETAMINOPHEN 975 MG: 325 TABLET ORAL at 08:22

## 2023-12-06 RX ADMIN — IBUPROFEN 600 MG: 600 TABLET ORAL at 20:16

## 2023-12-06 RX ADMIN — IBUPROFEN 600 MG: 600 TABLET ORAL at 08:21

## 2023-12-06 RX ADMIN — ACETAMINOPHEN 975 MG: 325 TABLET ORAL at 20:17

## 2023-12-06 ASSESSMENT — PAIN SCALES - GENERAL
PAIN_LEVEL: 0
PAINLEVEL_OUTOF10: 7
PAINLEVEL_OUTOF10: 8
PAINLEVEL_OUTOF10: 7
PAINLEVEL_OUTOF10: 8

## 2023-12-06 ASSESSMENT — PAIN DESCRIPTION - ORIENTATION: ORIENTATION: LOWER

## 2023-12-06 ASSESSMENT — PAIN - FUNCTIONAL ASSESSMENT
PAIN_FUNCTIONAL_ASSESSMENT: 0-10

## 2023-12-06 ASSESSMENT — PAIN DESCRIPTION - LOCATION: LOCATION: ABDOMEN

## 2023-12-06 NOTE — LACTATION NOTE
Lactation Consultant Note  Lactation Consultation  Reason for Consult: Initial assessment  Consultant Name: Patsy Hurst RN, IBCLC    Maternal Information  Has mother  before?: No  Infant to breast within first 2 hours of birth?: No    Maternal Assessment  Breast Assessment: Large, Pendulous, Soft, Warm, Compressible (expressible bilaterally)  Nipple Assessment: Intact, Erect with stimulation, Short  Areola Assessment: Normal    Infant Assessment  Infant Behavior: Quiet alert, Rooting response, Feeding cues observed    Feeding Assessment  Nutrition Source: Breastmilk, Formula (per mother’s request)  Feeding Method: Nursing at the breast  Feeding Position: Skin to skin, Football/seated, Mother needs assistance with latch/positioning  Suck/Feeding: Sustained, Coordinated suck/swallow/breathe, Tactile stimulation needed, Audible swallowing with stimulaton  Latch Assessment: Moderate assistance is needed, Areolar attachment, Eagerly grasped on to latch, Shallow latch, Deep latch obtained, Mouth not open wide enough, Optimal angle of mouth opening, Flanged lips, Comfortable latch, Bursts of sucking, swallowing, and rest    LATCH TOOL  Latch: Repeated attempts, hold nipple in mouth, stimulate to suck  Audible Swallowing: Spontaneous and intermittent (24 hours old)  Type of Nipple: Everted (After stimulation)  Comfort (Breast/Nipple): Soft/non-tender  Hold (Positioning): Full assist, staff holds infant at breast  LATCH Score: 7    Breast Pump       Other OB Lactation Tools       Patient Follow-up       Other OB Lactation Documentation       Recommendations/Summary  Mother states infant has been not wanting to latch to breast or even take formula via bottle as she has been a bit spitty today. Mother also states she feels she doesn't have any milk. Hand expression performed and showed mother colostrum from both breasts.    Infant showing some feeding cues and mother states she was willing to try to latch  infant with my assistance at the time of our visit. Infant placed skin to skin with mother in football hold with pillow support at right breast. Using hand expression and by stimulating shorter nipple to be more erect and also use of breast sandwich, infant readily latched deeply to right breast. Infant remained latched well with areolar attachment, nose and chin touching breast, lips flanged, sucks with long jaw movements and audible swallows with tactile stimulation. Infant would stay latched for a few minutes then come off. I suggested re-latching infant with breast sandwich and maybe holding a bit of breast support with left hand for infant to sustain latch longer. Educated mother on feeding infant based on feeding cues, waking infant to feed if it has been 3 hours since last feed, feeding infant on first breast until infant unlatches or until tactile stimulation is not keep infant sucking/swallowing at breast. I then recommended burping infant and then trying to latch/feed infant on other breast. Mother states she does not have a breast pump for home use. I will order a breast pump for mother per her request. Outpatient lactation resources discussed with mother. I encouraged mother to call for any questions, concerns or assistance.

## 2023-12-06 NOTE — CARE PLAN
The patient's goals for the shift include bonding with infant    The clinical goals for the shift include no s/sx of PPH or HDP      Problem: Postpartum  Goal: Experiences normal postpartum course  Outcome: Progressing  Goal: Appropriate maternal -  bonding  Outcome: Progressing  Goal: Establish and maintain infant feeding pattern for adequate nutrition  Outcome: Progressing  Goal: No s/sx infection  Outcome: Progressing  Goal: No s/sx of hemorrhage  Outcome: Progressing  Goal: Minimal s/sx of HDP and BP<160/110  Outcome: Progressing     Problem: Safety - Adult  Goal: Free from fall injury  Outcome: Progressing

## 2023-12-06 NOTE — PROGRESS NOTES
Postpartum Progress Note    Assessment/Plan   Rizwan Espana is a 23 y.o., , who delivered at 39w3d gestation and is now postpartum day 1.    #Postpartum  - continue routine postpartum care  - pain well controlled on po medications  - dvt risk score   5 , for ppx lovenox      #Maternal Well-Being  - emotional support provided  - bonding with infant    #Ada Feeding  - breastfeeding/pumping encouraged; lactation consult prn    #Dispo  - - Anticipate DC PPD#3 pending BP control.  - The signs and symptoms of PEC were reviewed with the patient, including unrelenting headache, vision changes/blurred vision, and RUQ pain  - BP cuff for home for checking BP twice a day  - Pt instructed to call primary OB if SBP > 160 or DBP > 110 or if development of PEC symptoms   - On discharge, follow up with primary OB in:       - 2-5 days for BP check        Active Problems:  There are no active Hospital Problems.    Pregnancy Problems (from 24 to present)       Problem Noted Resolved    HSV infection 10/16/2023 by MIKE Hanna-CNP No    Priority:  Medium      Overview Signed 2023  9:45 AM by EDGAR Scott     HSV prophylaxis starting 36wk         39 weeks gestation of pregnancy 2023 by Jamar Morales MD 2023 by EDGAR Tate    Encounter for supervision of normal pregnancy, antepartum 2023 by Nyasia Dewitt MD 2023 by EDGAR Tate    Overview Signed 2023  4:57 PM by Nyasia Dewitt MD     Dating:   [x] Initial BMI: 29  [x] Prenatal Labs: 2023  [x] Aneuploidy Screening:  rrr cfDNA 23  [x] Baby ASA: continue   [x] Anatomy US: . - WNL  [x] 1hr GCT at 24-28wks: Abnormal 1h, normal 3h gtt  [x] Tdap (27-36wks): Received 2023  [x] Flu Shot: Declined 2023  [] COVID vaccine:   [x] Rhogam (if Rh neg): Not indicated  [x] GBS at 36 wks: Negative as of   [x] Breastfeeding- Desires to breastfeed  [x] PPBC:  Desires patch. Counseled on contraindications, including elevated BMI. Additionally discussed elevated VTE risk and potentially decreased milk supply in the postpartum period with estrogen-containing BC. States she plans to pursue abstinence. Continue to address at subsequent visits.   [x] 39 weeks discussion of IOL vs. Expectant management: Desires 39wk IOL. Will message for scheduling. Reviewed IOL process  [x] Mode of delivery:  Desires VB. Discussed potential need for CS for maternal/fetal indications or if experiencing active HSV outbreak                Hospital course: no complications  Vaginal Birth  Patient is not breastfeedingThe patient's blood type is A POS. The baby's blood type is pending. Rhogam is not indicated.    Subjective   Her pain is well controlled with current medications  She is passing flatus  She is ambulating well  She is tolerating a Adult diet Regular  She reports no breast or nursing problems, bottle feeding  She denies emotional concerns today   Her plan for contraception is none     Discussed ghtn/PEC, monitoring and follow up.    Objective   Allergies:   Patient has no known allergies.         Last Vitals:  Temp Pulse Resp BP MAP Pulse Ox   36.3 °C (97.3 °F) 87 20 120/81   95 %     Vitals Min/Max Last 24 Hours:  Temp  Min: 35.8 °C (96.4 °F)  Max: 36.6 °C (97.9 °F)  Pulse  Min: 82  Max: 104  Resp  Min: 16  Max: 20  BP  Min: 112/72  Max: 138/85    Intake/Output:   No intake or output data in the 24 hours ending 12/06/23 1404    Physical Exam:  General: Examination reveals a well developed, well nourished, female, in no acute distress. She is alert and cooperative.  Lungs: symmetrical, non-labored breathing.  Cardiac: warm, well-perfused.  Abdomen: soft, non-tender.  Fundus: firm, at umbilicus, and nontender.  Extremities: no redness or tenderness in the calves or thighs.  Neurological: alert, oriented, normal speech, no focal findings or movement disorder noted.     Lab Data:  Lab  "Results   Component Value Date    WBC 18.5 (H) 12/05/2023    HGB 11.2 (L) 12/05/2023    HCT 34.2 (L) 12/05/2023     12/05/2023     No results found for: \"GLUCOSE\", \"NA\", \"K\", \"CL\", \"CO2\", \"ANIONGAP\", \"BUN\", \"CREATININE\", \"EGFR\", \"CALCIUM\", \"ALBUMIN\", \"PROT\", \"ALKPHOS\", \"ALT\", \"AST\", \"BILITOT\"  "

## 2023-12-06 NOTE — ANESTHESIA POSTPROCEDURE EVALUATION
Patient: Rizwan Espana    Procedure Summary       Date: 12/04/23 Room / Location:     Anesthesia Start: 1435 Anesthesia Stop: 12/05/23 0420    Procedure: Labor Analgesia Diagnosis:     Scheduled Providers:  Responsible Provider: Garrett Jenkins MD PhD    Anesthesia Type: epidural ASA Status: 3            Anesthesia Type: epidural    Vitals:    12/06/23 0424   BP:    Pulse:    Resp:    Temp: 36.6 °C (97.9 °F)   SpO2:          Anesthesia Post Evaluation    Patient location during evaluation: bedside  Patient participation: complete - patient participated  Level of consciousness: awake and alert  Pain score: 0  Pain management: satisfactory to patient  Airway patency: patent  Cardiovascular status: stable  Respiratory status: room air  Hydration status: stable  Postoperative Nausea and Vomiting: none        No notable events documented.

## 2023-12-07 VITALS
TEMPERATURE: 97.5 F | HEART RATE: 89 BPM | BODY MASS INDEX: 38.44 KG/M2 | OXYGEN SATURATION: 99 % | RESPIRATION RATE: 18 BRPM | HEIGHT: 66 IN | WEIGHT: 239.2 LBS | SYSTOLIC BLOOD PRESSURE: 123 MMHG | DIASTOLIC BLOOD PRESSURE: 81 MMHG

## 2023-12-07 PROCEDURE — 2500000001 HC RX 250 WO HCPCS SELF ADMINISTERED DRUGS (ALT 637 FOR MEDICARE OP)

## 2023-12-07 PROCEDURE — 2500000005 HC RX 250 GENERAL PHARMACY W/O HCPCS

## 2023-12-07 RX ORDER — IBUPROFEN 600 MG/1
600 TABLET ORAL EVERY 6 HOURS PRN
Qty: 120 TABLET | Refills: 0 | Status: SHIPPED | OUTPATIENT
Start: 2023-12-07

## 2023-12-07 RX ORDER — ACETAMINOPHEN 500 MG
1000 TABLET ORAL EVERY 6 HOURS PRN
Qty: 120 TABLET | Refills: 0 | Status: SHIPPED | OUTPATIENT
Start: 2023-12-07 | End: 2023-12-07 | Stop reason: SDUPTHER

## 2023-12-07 RX ORDER — DOCUSATE SODIUM 100 MG/1
100 CAPSULE, LIQUID FILLED ORAL 2 TIMES DAILY PRN
Qty: 60 CAPSULE | Refills: 0 | Status: SHIPPED | OUTPATIENT
Start: 2023-12-07 | End: 2023-12-07 | Stop reason: SDUPTHER

## 2023-12-07 RX ORDER — ACETAMINOPHEN 500 MG
1000 TABLET ORAL EVERY 6 HOURS PRN
Qty: 120 TABLET | Refills: 0 | Status: SHIPPED | OUTPATIENT
Start: 2023-12-07

## 2023-12-07 RX ORDER — IBUPROFEN 600 MG/1
600 TABLET ORAL EVERY 6 HOURS PRN
Qty: 120 TABLET | Refills: 0 | Status: SHIPPED | OUTPATIENT
Start: 2023-12-07 | End: 2023-12-07 | Stop reason: SDUPTHER

## 2023-12-07 RX ORDER — DOCUSATE SODIUM 100 MG/1
100 CAPSULE, LIQUID FILLED ORAL 2 TIMES DAILY PRN
Qty: 60 CAPSULE | Refills: 0 | Status: SHIPPED | OUTPATIENT
Start: 2023-12-07

## 2023-12-07 RX ADMIN — ACETAMINOPHEN 975 MG: 325 TABLET ORAL at 08:56

## 2023-12-07 RX ADMIN — IBUPROFEN 600 MG: 600 TABLET ORAL at 02:46

## 2023-12-07 RX ADMIN — IBUPROFEN 600 MG: 600 TABLET ORAL at 08:56

## 2023-12-07 RX ADMIN — ACETAMINOPHEN 975 MG: 325 TABLET ORAL at 02:46

## 2023-12-07 RX ADMIN — LIDOCAINE 1 PATCH: 4 PATCH TOPICAL at 08:02

## 2023-12-07 ASSESSMENT — PAIN SCALES - GENERAL
PAINLEVEL_OUTOF10: 9
PAINLEVEL_OUTOF10: 6

## 2023-12-07 ASSESSMENT — PAIN DESCRIPTION - LOCATION: LOCATION: BACK

## 2023-12-07 ASSESSMENT — PAIN - FUNCTIONAL ASSESSMENT: PAIN_FUNCTIONAL_ASSESSMENT: 0-10

## 2023-12-07 NOTE — DISCHARGE SUMMARY
Discharge Summary    Admission Date: 2023  Discharge Date: 23  Discharge Diagnosis: 39 weeks gestation of pregnancy     Patient Active Problem List   Diagnosis    Obesity    Mild persistent asthma    HSV infection    Ultrasound for  screening for fetal growth restriction    Premature beats       Hospital Course  Rizwan Espana is a 23 y.o.,     Initially presented for: rr IOL    Admission Date: 2023    Delivery Date: 2023  4:20 AM     Delivery type: Vaginal, Spontaneous      GA at delivery: 39w3d    Outcome: Living     Anesthesia during delivery: Epidural     Intrapartum complications: Shoulder Dystocia     Feeding method: Breastfeeding Status: No    Contraception: Defers contraception to primary OB/PP visit. We discussed pregnancy spacing of at least one year, abstaining from intercourse for 6wks, and the ability to become pregnant in the absence of regular menses. Pt verbalized understanding.     Rhogam: The patient's blood type is A POS.  Rhogam is not indicated.     Now postpartum day: 2.    Hospital course n/f:    PP course uneventful.  Meeting all postpartum milestones- ambulating independently, passing flatus, tolerating PO intake, lochia light, voiding spontaneously, and pain well controlled with PO meds.     Dispo  OK for DC today and 6 week postpartum follow-up with prenatal provider.     Pertinent Physical Exam At Time of Discharge  General: well appearing, well nourished, postpartum  Obstetric: fundus firm below umbilicus, lochia light  Skin: Warm, dry; no rashes/lesions/erythema  Breast: No masses, nipple discharge  Neuro: A/Ox3, conversational, no gross motor deficit   GI: no distension, appropriately tender, soft, +BS  Respiratory: Even and unlabored on RA, LSCTA BL  Cardiovascular: Trace BLE edema; No erythema, warmth  Psych: appropriate mood and affect       Your medication list        START taking these medications        Instructions Last Dose Given Next  Dose Due   acetaminophen 500 mg tablet  Commonly known as: Tylenol      Take 2 tablets (1,000 mg) by mouth every 6 hours if needed for moderate pain (4 - 6).       docusate sodium 100 mg capsule  Commonly known as: Colace      Take 1 capsule (100 mg) by mouth 2 times a day as needed for constipation.       ibuprofen 600 mg tablet      Take 1 tablet (600 mg) by mouth every 6 hours if needed for moderate pain (4 - 6) (pain).              CONTINUE taking these medications        Instructions Last Dose Given Next Dose Due   Prenatal Multi  mg-mcg tablet  Generic drug: prenatal no122-iron-folic acid           sodium chloride 0.65 % nasal spray  Commonly known as: [place]      Administer 1 spray into each nostril if needed for congestion.       Ventolin HFA 90 mcg/actuation inhaler  Generic drug: albuterol                  STOP taking these medications      aspirin 81 mg EC tablet        guaiFENesin 600 mg 12 hr tablet  Commonly known as: Mucinex        valACYclovir 500 mg tablet  Commonly known as: Valtrex                  Where to Get Your Medications        These medications were sent to [facility]/pharmacy #[id number] - [facility] [id number] [facility]  [address], [place] [id number]      Phone: [phone]   acetaminophen 500 mg tablet  docusate sodium 100 mg capsule  ibuprofen 600 mg tablet           Outpatient Follow-Up  No future appointments.    MIKE Reilly-CNP

## 2023-12-07 NOTE — CARE PLAN
The patient's goals for the shift include bonding with infant    The clinical goals for the shift include lochia remain scant-light    Patient has had stable VS and assessments, pain well controlled and bleeding has been appropriate during this shift.  Pt adequate for discharge

## 2023-12-07 NOTE — CARE PLAN
Problem: Postpartum  Goal: Experiences normal postpartum course  Outcome: Progressing  Goal: Appropriate maternal -  bonding  Outcome: Progressing  Goal: Establish and maintain infant feeding pattern for adequate nutrition  Outcome: Progressing  Goal: Incisions, wounds, or drain sites healing without S/S of infection  Outcome: Progressing  Goal: No s/sx infection  Outcome: Progressing  Goal: No s/sx of hemorrhage  Outcome: Progressing  Goal: Minimal s/sx of HDP and BP<160/110  Outcome: Progressing   The patient's goals for the shift include bonding with infant    The clinical goals for the shift include BP <160/110

## 2023-12-08 NOTE — SIGNIFICANT EVENT
"   12/08/23 1521   Follow Up Phone Call   Do you have questions about your home instructions? No   Did the nurse use a  to talk to you about your discharge instructions? Not applicable   Were you able to fill all of your prescriptions? Yes with exceptions  (\"scripts were sent to 2 different pharmacies\")   Do you have questions about your medication instructions? No   Do you know your follow up appointments? No   If you had home services arranged have they begun? Not applicable   If you had equipment ordered for home, did it arrive? Not applicable   Do you know who to call with worsening symptoms? Yes   It is very important to us that we are sensitive to all of your needs and we are responsive to your complaints and concerns. Do you have any additional questions I can assist you with? No     Follow-up Phone Call Note:   Interview:  Care Type: Women's Health   Phone Number Call:  [phone]   Call Outcome: connected with patient   Patient Reports Feeling (symptoms) Are: better   Patient Has a Primary Care Provider: yes   Post-hospitalization Follow-up Occurred According To Schedule: no   Reason:  appointment not scheduled, encouraged patient to call     Delivered Baby(ies): yes   Chest Pain: no     Shortness of breath or difficulty breathing: no   Seizures: no   Any thoughts of hurting yourself or your baby: no   Bleeding that is soaking through one pad/hour or blood clots the size of an egg or bigger: no   Incision that is not healing:  no   Red or swollen leg that is painful or warm to the touch: no   Temperature of 100.4F or higher: no   Headache that does not get better, even after taking medicine, or a bad headache with vision changes: no   Where or in what is your baby sleeping?:  bassinet   ABC's of sleep covered:  yes   How Are You Feeding Your Baby(ies): formula   Baby Getting Anything Other Than Breastmilk Or Formula For Food:  no   Patient Has Primary Care Provider For Baby(ies): yes   Baby Has " Been Seen By a Health Care Provider Since Discharge:  yes   Which Health Care Provider Saw the Baby: physician office or clinic visit   Mom's Discharge Date: 12/7/23   Date the Baby Was Seen By a Health Care Provider After Discharge: 12/8/23   Date/Time Of Call: 12/8/23 @ 1521  Comments: post birth warning signs reviewed   Call Back Done By: care coordinator   Callback Complete:  Yes

## 2024-01-12 ENCOUNTER — APPOINTMENT (OUTPATIENT)
Dept: OBSTETRICS AND GYNECOLOGY | Facility: CLINIC | Age: 24
End: 2024-01-12
Payer: COMMERCIAL

## 2024-01-26 ENCOUNTER — APPOINTMENT (OUTPATIENT)
Dept: OBSTETRICS AND GYNECOLOGY | Facility: CLINIC | Age: 24
End: 2024-01-26
Payer: COMMERCIAL

## 2024-02-02 ENCOUNTER — POSTPARTUM VISIT (OUTPATIENT)
Dept: OBSTETRICS AND GYNECOLOGY | Facility: CLINIC | Age: 24
End: 2024-02-02
Payer: COMMERCIAL

## 2024-02-02 ENCOUNTER — SOCIAL WORK (OUTPATIENT)
Dept: PEDIATRICS | Facility: CLINIC | Age: 24
End: 2024-02-02

## 2024-02-02 VITALS
HEART RATE: 94 BPM | BODY MASS INDEX: 35.29 KG/M2 | DIASTOLIC BLOOD PRESSURE: 80 MMHG | WEIGHT: 211.8 LBS | HEIGHT: 65 IN | SYSTOLIC BLOOD PRESSURE: 135 MMHG

## 2024-02-02 DIAGNOSIS — Z30.013 ENCOUNTER FOR INITIAL PRESCRIPTION OF INJECTABLE CONTRACEPTIVE: ICD-10-CM

## 2024-02-02 DIAGNOSIS — Z32.02 PREGNANCY TEST NEGATIVE: Primary | ICD-10-CM

## 2024-02-02 DIAGNOSIS — M54.9 BACK PAIN, UNSPECIFIED BACK LOCATION, UNSPECIFIED BACK PAIN LATERALITY, UNSPECIFIED CHRONICITY: ICD-10-CM

## 2024-02-02 DIAGNOSIS — L29.9 PRURITUS OF NIPPLE: ICD-10-CM

## 2024-02-02 LAB — PREGNANCY TEST URINE, POC: NEGATIVE

## 2024-02-02 PROCEDURE — 81025 URINE PREGNANCY TEST: CPT | Performed by: NURSE PRACTITIONER

## 2024-02-02 PROCEDURE — 96372 THER/PROPH/DIAG INJ SC/IM: CPT | Performed by: NURSE PRACTITIONER

## 2024-02-02 PROCEDURE — 99213 OFFICE O/P EST LOW 20 MIN: CPT | Performed by: NURSE PRACTITIONER

## 2024-02-02 PROCEDURE — 2500000004 HC RX 250 GENERAL PHARMACY W/ HCPCS (ALT 636 FOR OP/ED): Mod: SE | Performed by: NURSE PRACTITIONER

## 2024-02-02 RX ORDER — MEDROXYPROGESTERONE ACETATE 150 MG/ML
150 INJECTION, SUSPENSION INTRAMUSCULAR
Status: SHIPPED | OUTPATIENT
Start: 2024-02-02 | End: 2025-04-27

## 2024-02-02 RX ORDER — CYCLOBENZAPRINE HCL 5 MG
5 TABLET ORAL 3 TIMES DAILY
Qty: 30 TABLET | Refills: 0 | Status: SHIPPED | OUTPATIENT
Start: 2024-02-02 | End: 2024-02-12

## 2024-02-02 RX ORDER — CYCLOBENZAPRINE HCL 5 MG
5 TABLET ORAL 3 TIMES DAILY
Qty: 30 TABLET | Refills: 0 | Status: SHIPPED | OUTPATIENT
Start: 2024-02-02 | End: 2024-02-02 | Stop reason: SDUPTHER

## 2024-02-02 RX ADMIN — MEDROXYPROGESTERONE ACETATE 150 MG: 150 INJECTION, SUSPENSION INTRAMUSCULAR at 14:54

## 2024-02-02 ASSESSMENT — EDINBURGH POSTNATAL DEPRESSION SCALE (EPDS)
TOTAL SCORE: 0
I HAVE FELT SAD OR MISERABLE: NO, NOT AT ALL
I HAVE BEEN SO UNHAPPY THAT I HAVE BEEN CRYING: NO, NEVER
I HAVE BEEN ABLE TO LAUGH AND SEE THE FUNNY SIDE OF THINGS: AS MUCH AS I ALWAYS COULD
I HAVE LOOKED FORWARD WITH ENJOYMENT TO THINGS: AS MUCH AS I EVER DID
I HAVE FELT SCARED OR PANICKY FOR NO GOOD REASON: NO, NOT AT ALL
I HAVE BEEN ANXIOUS OR WORRIED FOR NO GOOD REASON: NO, NOT AT ALL
I HAVE BLAMED MYSELF UNNECESSARILY WHEN THINGS WENT WRONG: NO, NEVER
THINGS HAVE BEEN GETTING ON TOP OF ME: NO, I HAVE BEEN COPING AS WELL AS EVER
THE THOUGHT OF HARMING MYSELF HAS OCCURRED TO ME: NEVER
I HAVE BEEN SO UNHAPPY THAT I HAVE HAD DIFFICULTY SLEEPING: NOT AT ALL

## 2024-02-02 ASSESSMENT — PAIN SCALES - GENERAL: PAINLEVEL: 0-NO PAIN

## 2024-02-02 NOTE — PROGRESS NOTES
Plan    Advised to call office for breast complaints, abnormal bleeding, mood changes, or other concerning symptoms.   Cleared to resume normal activity as desired  SW to offer resources for counseling. Reviewed s/sx of PP depression/anxiety and encouraged to reach out with any concerns.    Diagnoses and all orders for this visit:  Back pain, unspecified back location, unspecified back pain laterality, unspecified chronicity  -     cyclobenzaprine (Flexeril) 5 mg tablet; Take 1 tablet (5 mg) by mouth 3 times a day for 10 days.  -    Referral to Physical Therapy; Future  - Offered referral for chiropractic care, patient declines at this time; Recommended Tylenol, heat, warm baths/shower for pain relief   - Advised patient to follow up with primary care if back pain does not improve or worsen  Pruritus of nipple  -     clotrimazole 1 % ointment; Apply to nipples BID PRN  - Reviewed s/sx of mastitis, advised to call with concerns  Encounter for initial prescription of injectable contraceptive  -     medroxyPROGESTERone (Depo-Provera) injection 150 mg    Follow up in about 3 months (around 2024) for RN Visit for Depo.    EDGAR Ventura, APRN-CNP    Subjective   24 y.o.  presenting for postpartum follow-up     Delivery Date: 23  Gestational Age: 39.3   Type of Delivery: Vaginal, Spontaneous , c/b shoulder dystocia, birth weight = 3160g    Pregnancy Problems (from 23 to present)       Problem Noted Resolved    HSV infection 10/16/2023 by LLUVIA Hanna No    Overview Signed 2023  9:45 AM by EDGAR Scott     HSV prophylaxis starting 36wk         39 weeks gestation of pregnancy 2023 by Jamar Morales MD 2023 by EDGAR Tate    Encounter for supervision of normal pregnancy, antepartum 2023 by Nyasia Dewitt MD 2023 by EDGAR Tate    Overview Signed 2023  4:57 PM by Nyasia Dewitt MD     Dating:  "  [x] Initial BMI: 29  [x] Prenatal Labs: 2023  [x] Aneuploidy Screening:  rrr cfDNA 23  [x] Baby ASA: continue   [x] Anatomy US: . - WNL  [x] 1hr GCT at 24-28wks: Abnormal 1h, normal 3h gtt  [x] Tdap (27-36wks): Received 2023  [x] Flu Shot: Declined 2023  [] COVID vaccine:   [x] Rhogam (if Rh neg): Not indicated  [x] GBS at 36 wks: Negative as of   [x] Breastfeeding- Desires to breastfeed  [x] PPBC: Desires patch. Counseled on contraindications, including elevated BMI. Additionally discussed elevated VTE risk and potentially decreased milk supply in the postpartum period with estrogen-containing BC. States she plans to pursue abstinence. Continue to address at subsequent visits.   [x] 39 weeks discussion of IOL vs. Expectant management: Desires 39wk IOL. Will message for scheduling. Reviewed IOL process  [x] Mode of delivery:  Desires VB. Discussed potential need for CS for maternal/fetal indications or if experiencing active HSV outbreak           Concerns: Endorses generalized back pain; has been taking tylenol and iburofen q6h.  Additionally, she is requesting referral for counseling; feels mood is stable but \"has a lot on her mind\"    Pain: controlled  Lacerations: 2nd degree  Lochia: resolved 1.5 weeks ago  Sexual Intimacy: Yes, using condoms  Contraceptive Method: desires depo  Feeding Method: She is bottle feeding. breast tendernesss and nipple tenderness -- endorses nipple pruritus; denies erythema or pain, denies fevers/chills/malaise  Lactation Consult Needed?: No    Birth Trauma: No  Bonding with Baby: well with baby girl  Mood:   Postpartum Depression: Low Risk  (2024)    [place]  Depression Scale     Last EPDS Total Score: 0     Last EPDS Self Harm Result: Never     Last pap: 2023 NILM    Objective   /80   Pulse 94   Ht 1.651 m (5' 5\")   Wt 96.1 kg (211 lb 12.8 oz)   LMP 2023 (Exact Date)   Breastfeeding No   BMI 35.25 kg/m² "     Physical Exam  Constitutional:       Appearance: Normal appearance.   Pulmonary:      Effort: Pulmonary effort is normal.   Abdominal:      Palpations: Abdomen is soft.      Tenderness: There is no abdominal tenderness.   Musculoskeletal:         General: Signs of injury present.   Neurological:      General: No focal deficit present.      Mental Status: She is alert and oriented to person, place, and time. Mental status is at baseline.   Skin:     General: Skin is warm and dry.   Psychiatric:         Mood and Affect: Mood normal.         Behavior: Behavior normal.         Thought Content: Thought content normal.         Judgment: Judgment normal.

## 2024-02-02 NOTE — LETTER
Date: 2024  RE:  Rizwan Espana  :  2000      To Whom It May Concern:    Our patient, Rizwan, has been under our care and now may return back to work without restrictions.    Their return to work date is:  24    If you have questions concerning this patient's immediate care, please feel free to contact our office at [phone].    Sincerely,        Supervising Provider:  Kacey Thakkar CNM

## 2024-02-02 NOTE — PROGRESS NOTES
"Social Work Note:   Subjective   Rizwan Espana is a 24 y.o. female who presents for the following:     Patient Name:  Rizwan Espana  Medical Record Number:  [id number]  YOB: 2000    Date Seen:  2/2/2024    SW met with pt on this day at request of Galo BREWER. Pt reports that she would like to work on her, \"past trauma so I can be a good mother to my child\". Pt reports that her mother was not present for her and she wants to go to counseling to process this loss and work through her emotions. SW provided validation and active listening. Pt referred to [employer].     SW reviewed and gave her the [employer] Women's Mental [facility] packet. SW encouraged pt to attend [employer] and gave her the flier for [employer] and the upcoming [employer] event. BELLA also gave pt housing resources as she expressed a desire to move. SW contact information was also shared for any follow up needs.    Objective   Well appearing patient in no apparent distress; mood and affect are within normal limits.    Dillon Pratt MSW, LSW  "

## 2024-02-14 ENCOUNTER — TELEPHONE (OUTPATIENT)
Dept: PEDIATRICS | Facility: CLINIC | Age: 24
End: 2024-02-14
Payer: COMMERCIAL

## 2024-02-14 NOTE — TELEPHONE ENCOUNTER
PCF pt to check on referral for counseling. BELLA had referred pt to [facility] on 2/2. Discussed timeline for calls from agency and pt will reach out to SW EOD Friday if she has not heard from [facility]. BELLA did send follow up email to [facility] on this day to confirm contact information for pt.

## 2024-03-08 ENCOUNTER — SOCIAL WORK (OUTPATIENT)
Dept: PEDIATRICS | Facility: CLINIC | Age: 24
End: 2024-03-08
Payer: COMMERCIAL

## 2024-03-08 NOTE — PROGRESS NOTES
Date Seen: 3/8/24     Medical Staff Referring:     Med staff reason for referral: Counseling  X    Housing      Clothing     Food      Baby Needs     School     Legal   Transportation  Other    Pt was in Peds for her child's appointment. She reports that she did not hear back from [facility] regarding services.       BELLA assessment: [email or web address] Espana ( [phone]) on this day at provider's request. Family identified counseling as current needs.     SW talked with pt about needed counseling that had been discussed and referral made last month. SW has met with her previously. [facility] had not contacted pt. SW had been to this agency's location on this day as agency had not responded to calls or emails. Meeting was held with [facility] and this visit was discussed with pt. At this time she is requesting a referral to a new agency due to lack of communication from [facility]. SW is changing pt mother counseling referral location to [facility].     SW assessed family for other needs. Pt reports she would like to information for the maternal vitality study again. BELLA provided the Maternal Vitality study information and the flyer for [facility] and the Program list for our facility for pt.  BELLA contact information was shared with the family for any future needs. BELLA reminded pt to call in two weeks if no response from [facility].       Follow up plan:      SW to make referral __X__  BELLA will check in at next pt exam ____  SW will contact family ____  Family will contact SW with any future needs __X__    Dillon MEZA, LSW

## 2024-03-12 ENCOUNTER — APPOINTMENT (OUTPATIENT)
Dept: PHYSICAL THERAPY | Facility: CLINIC | Age: 24
End: 2024-03-12
Payer: COMMERCIAL

## 2024-04-08 ENCOUNTER — HOSPITAL ENCOUNTER (EMERGENCY)
Facility: HOSPITAL | Age: 24
Discharge: LEFT BEFORE DISCHARGE | End: 2024-04-08
Payer: COMMERCIAL

## 2024-04-08 VITALS
SYSTOLIC BLOOD PRESSURE: 127 MMHG | TEMPERATURE: 98.8 F | HEART RATE: 115 BPM | WEIGHT: 217 LBS | DIASTOLIC BLOOD PRESSURE: 83 MMHG | BODY MASS INDEX: 34.87 KG/M2 | HEIGHT: 66 IN | RESPIRATION RATE: 18 BRPM | OXYGEN SATURATION: 99 %

## 2024-04-08 PROCEDURE — 4500999001 HC ED NO CHARGE

## 2024-04-08 ASSESSMENT — PAIN DESCRIPTION - PROGRESSION: CLINICAL_PROGRESSION: NOT CHANGED

## 2024-04-08 ASSESSMENT — PAIN - FUNCTIONAL ASSESSMENT: PAIN_FUNCTIONAL_ASSESSMENT: 0-10

## 2024-04-08 ASSESSMENT — PAIN SCALES - GENERAL: PAINLEVEL_OUTOF10: 10 - WORST POSSIBLE PAIN

## 2024-04-08 ASSESSMENT — PAIN DESCRIPTION - DESCRIPTORS: DESCRIPTORS: ACHING

## 2024-04-08 ASSESSMENT — PAIN DESCRIPTION - LOCATION: LOCATION: BACK

## 2024-04-08 ASSESSMENT — PAIN DESCRIPTION - ORIENTATION: ORIENTATION: RIGHT

## 2024-04-08 ASSESSMENT — PAIN DESCRIPTION - ONSET: ONSET: ONGOING

## 2024-04-08 ASSESSMENT — PAIN DESCRIPTION - FREQUENCY: FREQUENCY: CONSTANT/CONTINUOUS

## 2024-04-08 ASSESSMENT — PAIN DESCRIPTION - PAIN TYPE: TYPE: ACUTE PAIN
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
## 2024-05-06 ENCOUNTER — EVALUATION (OUTPATIENT)
Dept: PHYSICAL THERAPY | Facility: CLINIC | Age: 24
End: 2024-05-06
Payer: COMMERCIAL

## 2024-05-06 DIAGNOSIS — M54.9 BACK PAIN, UNSPECIFIED BACK LOCATION, UNSPECIFIED BACK PAIN LATERALITY, UNSPECIFIED CHRONICITY: ICD-10-CM

## 2024-05-06 PROCEDURE — 97161 PT EVAL LOW COMPLEX 20 MIN: CPT | Mod: GP

## 2024-05-06 PROCEDURE — 97110 THERAPEUTIC EXERCISES: CPT | Mod: GP

## 2024-05-06 ASSESSMENT — PAIN SCALES - GENERAL: PAINLEVEL_OUTOF10: 8

## 2024-05-06 ASSESSMENT — ENCOUNTER SYMPTOMS
LOSS OF SENSATION IN FEET: 0
OCCASIONAL FEELINGS OF UNSTEADINESS: 0
DEPRESSION: 0

## 2024-05-13 ENCOUNTER — APPOINTMENT (OUTPATIENT)
Dept: PHYSICAL THERAPY | Facility: CLINIC | Age: 24
End: 2024-05-13
Payer: COMMERCIAL

## 2024-05-23 ENCOUNTER — DOCUMENTATION (OUTPATIENT)
Dept: PHYSICAL THERAPY | Facility: CLINIC | Age: 24
End: 2024-05-23
Payer: COMMERCIAL

## 2024-05-23 NOTE — PROGRESS NOTES
Physical Therapy                 Therapy Communication Note    Patient Name: Rizwan Espana  MRN: [id number]  Today's Date: 5/23/2024     Discipline: Physical Therapy    Missed Visit Reason:  Pt was called and voice mail was left letting them know that they had missed their appointment and when their next scheduled visit was.     Missed Time: No Show    Comment:

## 2024-05-29 ENCOUNTER — DOCUMENTATION (OUTPATIENT)
Dept: PHYSICAL THERAPY | Facility: CLINIC | Age: 24
End: 2024-05-29
Payer: COMMERCIAL

## 2024-05-29 NOTE — PROGRESS NOTES
Physical Therapy                 Therapy Communication Note    Patient Name: Rizwan Espana  MRN: [id number]  Today's Date: 5/29/2024     Discipline: Physical Therapy    Missed Visit Reason:  No Show    Missed Time: No Show    Comment: Pt was called and a voice mail was left for the second time explaining that they had an appointment and the policy of this clinic in which 3 no shows result in a discharge from therapy.

## 2024-06-04 NOTE — PROGRESS NOTES
Physical Therapy  Physical Therapy Treatment    Patient Name: Rizwan Espana  MRN: [id number]  Today's Date: 6/6/2024       Referring Physician: Kacey Thakkar CNP  Visit #: 2 of 30  Insurance: [employer], no auth, no copay      Current Problem  1. Back pain, unspecified back location, unspecified back pain laterality, unspecified chronicity  Follow Up In Physical Therapy             Precautions  Precautions  STEADI Fall Risk Score (The score of 4 or more indicates an increased risk of falling): 0    Pain Score: 10 - Worst possible pain  Performing HEP: partially      Subjective   Patient 15 min late due to dropping off daughter at . Patient states increased back pain in center of lumbar spine which is sharp with lifting and bending. Pain sometimes in upper back as well    Objective   Palpation: increased tenderness over center cervical  spine, lumbar spine    Treatment:        Stepper x5' L2  Review and perform HEP:   PT x10  SKC x3 with 10 sec hold  TR x3  Bridge x10  Passive stretching B hamstring, piriformis, hip flexor  GB heel slide x2'  GB bridge x2'  Green TB hip ER x1'  Green TB clam x1' R/L  Seated back ext with purple band x10  GB seated march x10  GB seated LAQ x10  GB seated chest pass with ball x10  GB seated trunk rotation with ball out front x10  GB seated ball flexion overhead x10    Charges: 2 ex    Assessment:   Good demonstration of home exercise program. Patient felt a little looser at end of session. Properly challenged with exercise.       Plan:   Assess symptoms next session and make changes as needed. Stress importance of compliance with PT appointments. Will schedule further PT if patient arrives next session.             Sweetie Hawkins, PT

## 2024-06-06 ENCOUNTER — TREATMENT (OUTPATIENT)
Dept: PHYSICAL THERAPY | Facility: CLINIC | Age: 24
End: 2024-06-06
Payer: COMMERCIAL

## 2024-06-06 DIAGNOSIS — M54.9 BACK PAIN, UNSPECIFIED BACK LOCATION, UNSPECIFIED BACK PAIN LATERALITY, UNSPECIFIED CHRONICITY: ICD-10-CM

## 2024-06-06 PROCEDURE — 97110 THERAPEUTIC EXERCISES: CPT | Mod: GP

## 2024-06-06 ASSESSMENT — PAIN SCALES - GENERAL: PAINLEVEL_OUTOF10: 10 - WORST POSSIBLE PAIN

## 2024-06-09 NOTE — PROGRESS NOTES
Physical Therapy  Physical Therapy Treatment    Patient Name: Rizwan Espana  MRN: [id number]  Today's Date: 6/9/2024       Referring Physician: Kacey Thakkar CNP  Visit #: 3 of 30  Insurance: [employer], no auth, no copay      Current Problem  No diagnosis found.         Precautions          Performing HEP: partially      Subjective   Patient 15 min late due to dropping off daughter at . Patient states increased back pain in center of lumbar spine which is sharp with lifting and bending. Pain sometimes in upper back as well    Objective   Palpation: increased tenderness over center cervical  spine, lumbar spine    Treatment:        Stepper x5' L2  Review and perform HEP:   PT x10  SKC x3 with 10 sec hold  TR x3  Bridge x10  Passive stretching B hamstring, piriformis, hip flexor  GB heel slide x2'  GB bridge x2'  Green TB hip ER x1'  Green TB clam x1' R/L  Seated back ext with purple band x10  GB seated march x10  GB seated LAQ x10  GB seated chest pass with ball x10  GB seated trunk rotation with ball out front x10  GB seated ball flexion overhead x10    Charges: 2 ex    Assessment:   Good demonstration of home exercise program. Patient felt a little looser at end of session. Properly challenged with exercise.       Plan:   Assess symptoms next session and make changes as needed. Stress importance of compliance with PT appointments. Will schedule further PT if patient arrives next session.             Sweetie Hawkins, PT

## 2024-06-11 ENCOUNTER — DOCUMENTATION (OUTPATIENT)
Dept: PHYSICAL THERAPY | Facility: CLINIC | Age: 24
End: 2024-06-11
Payer: COMMERCIAL

## 2024-06-11 ENCOUNTER — APPOINTMENT (OUTPATIENT)
Dept: PHYSICAL THERAPY | Facility: CLINIC | Age: 24
End: 2024-06-11
Payer: COMMERCIAL

## 2024-06-11 NOTE — PROGRESS NOTES
Therapy Communication Note    Patient Name: Rizwan Espana  MRN: [id number]  Today's Date: 6/11/2024     Discipline: Physical Therapy    Missed Time: Cancel    Comment: Patient cancelled same day via Mychart. No further appointments scheduled at this time

## 2024-08-27 ENCOUNTER — DOCUMENTATION (OUTPATIENT)
Dept: PHYSICAL THERAPY | Facility: CLINIC | Age: 24
End: 2024-08-27
Payer: COMMERCIAL

## 2024-08-27 NOTE — PROGRESS NOTES
Physical Therapy    Discharge Summary    Name: Rizwan Espana  MRN: [id number]  : 2000  Date: 24      Discharge Summary: PT    Discharge Information: Date of discharge 24, Date of last visit 24, Date of evaluation 24, Number of attended visits 2, Referred by Kacey Thakkar CNP, and Referred for back pain    Rehab Discharge Reason: Failed to schedule and/or keep follow-up appointment(s)

## 2024-09-12 NOTE — ASSESSMENT & PLAN NOTE
- FGR now resolved   - Most recent BPP today, 11/21- 8/8, normal interval growth. EFW 35%, AC 34%  - Continues to get regular growth scans   n/a

## 2025-05-06 ENCOUNTER — APPOINTMENT (OUTPATIENT)
Dept: OBSTETRICS AND GYNECOLOGY | Facility: CLINIC | Age: 25
End: 2025-05-06
Payer: COMMERCIAL

## 2025-06-02 PROBLEM — E66.9 OBESITY: Status: RESOLVED | Noted: 2023-10-12 | Resolved: 2025-06-02

## 2025-06-02 PROBLEM — B00.9 HSV INFECTION: Status: RESOLVED | Noted: 2023-10-16 | Resolved: 2025-06-02

## 2025-06-02 PROBLEM — J45.30 MILD PERSISTENT ASTHMA: Status: RESOLVED | Noted: 2022-03-01 | Resolved: 2025-06-02

## 2025-06-02 PROBLEM — Z36.4 ULTRASOUND FOR ANTENATAL SCREENING FOR FETAL GROWTH RESTRICTION (HHS-HCC): Status: RESOLVED | Noted: 2023-10-16 | Resolved: 2025-06-02

## 2025-06-02 PROBLEM — M54.9 BACK PAIN: Status: RESOLVED | Noted: 2024-05-06 | Resolved: 2025-06-02

## 2025-06-03 ENCOUNTER — APPOINTMENT (OUTPATIENT)
Dept: OBSTETRICS AND GYNECOLOGY | Facility: CLINIC | Age: 25
End: 2025-06-03
Payer: COMMERCIAL

## 2025-06-03 ENCOUNTER — DOCUMENTATION (OUTPATIENT)
Dept: OBSTETRICS AND GYNECOLOGY | Facility: CLINIC | Age: 25
End: 2025-06-03

## 2025-06-03 VITALS
SYSTOLIC BLOOD PRESSURE: 133 MMHG | HEART RATE: 116 BPM | WEIGHT: 222.5 LBS | BODY MASS INDEX: 35.91 KG/M2 | DIASTOLIC BLOOD PRESSURE: 84 MMHG

## 2025-06-03 DIAGNOSIS — Z30.09 BIRTH CONTROL COUNSELING: ICD-10-CM

## 2025-06-03 DIAGNOSIS — Z00.00 ENCOUNTER FOR WELL WOMAN EXAM WITHOUT GYNECOLOGICAL EXAM: Primary | ICD-10-CM

## 2025-06-03 DIAGNOSIS — Z11.3 SCREENING EXAMINATION FOR STI: ICD-10-CM

## 2025-06-03 PROBLEM — Z01.419 ENCOUNTER FOR ROUTINE GYNECOLOGICAL EXAMINATION: Status: ACTIVE | Noted: 2025-06-03

## 2025-06-03 PROCEDURE — 99395 PREV VISIT EST AGE 18-39: CPT | Performed by: ADVANCED PRACTICE MIDWIFE

## 2025-06-03 PROCEDURE — 1036F TOBACCO NON-USER: CPT | Performed by: ADVANCED PRACTICE MIDWIFE

## 2025-06-03 RX ORDER — DROSPIRENONE 4 MG/1
4 TABLET, FILM COATED ORAL DAILY
Qty: 28 TABLET | Refills: 11 | Status: SHIPPED | OUTPATIENT
Start: 2025-06-03

## 2025-06-03 RX ORDER — LEVONORGESTREL 1.5 MG/1
1.5 TABLET ORAL ONCE
Qty: 1 TABLET | Refills: 0 | Status: SHIPPED | OUTPATIENT
Start: 2025-06-03 | End: 2025-06-03

## 2025-06-03 ASSESSMENT — ENCOUNTER SYMPTOMS
ALLERGIC/IMMUNOLOGIC NEGATIVE: 0
GASTROINTESTINAL NEGATIVE: 0
ENDOCRINE NEGATIVE: 0
EYES NEGATIVE: 0
HEMATOLOGIC/LYMPHATIC NEGATIVE: 0
MUSCULOSKELETAL NEGATIVE: 0
CARDIOVASCULAR NEGATIVE: 0
RESPIRATORY NEGATIVE: 0
PSYCHIATRIC NEGATIVE: 0
CONSTITUTIONAL NEGATIVE: 0
NEUROLOGICAL NEGATIVE: 0

## 2025-06-03 ASSESSMENT — PAIN SCALES - GENERAL: PAINLEVEL_OUTOF10: 0-NO PAIN

## 2025-06-03 ASSESSMENT — PATIENT HEALTH QUESTIONNAIRE - PHQ9
2. FEELING DOWN, DEPRESSED OR HOPELESS: NOT AT ALL
1. LITTLE INTEREST OR PLEASURE IN DOING THINGS: NOT AT ALL
SUM OF ALL RESPONSES TO PHQ9 QUESTIONS 1 AND 2: 0

## 2025-06-03 NOTE — PROGRESS NOTES
"Shanna Espana \"Breann\" is a 25 y.o. female who is here for an annual gyn exam.   Concerns about being pregnant after having unprotected intercourse last night and would like a pregnancy test. Amenable to plan B rx sent.     Patient would like to start on birth control. She would like to be on hormonal contraceptive patches. Patient denies hx of HTN, PE/DVT, migraines (on chart review though multiple elevated Bps noted in 2023 and 2024). After discussion of patch not being safe patient was ok with POPs sent.     Patient's last menstrual period was 05/12/2025 (exact date).  Periods are irregular since being on birth control. lasting 5 days.   Dysmenorrhea:severe, occurring first 1-2 days of flow.   Cyclic symptoms include anxiety, bloating, breast tenderness, constipation, and irritability. No intermenstrual bleeding, spotting, or discharge.    Last pap: 6/30/2023 WNL  History of abnormal Pap smear: no  STI testing: yes - blood and urine   HPV vaccination: Yes x2    IPV screen:  Have you ever experienced any form of emotional, physical, sexual or financial abuse? no  Have you ever been hit, pushed, bitten, kicked, choked or grabbed by your partner or an ex-partner? no  Do you ever feel scared or threatened by your partner or ex-partner? no    SoHx:    Vaginal hygiene: soap and water  Loss of sexual desire: No  Pain with sex: No  Able to orgasm: Yes   Living Situation: daughter  School/Employment: medical assistant   Exercise: No     Substance:   Tobacco Use: Low Risk  (6/3/2025)    Patient History     Smoking Tobacco Use: Never     Smokeless Tobacco Use: Never     Passive Exposure: Not on file   Recent Concern: Tobacco Use - Medium Risk (4/15/2025)    Received from [facility]    Patient History     Smoking Tobacco Use: Former     Smokeless Tobacco Use: Never     Passive Exposure: Not on file      Social History     Substance and Sexual Activity   Drug Use Never      Social History     Substance " and Sexual Activity   Alcohol Use Not Currently       Social History     Substance and Sexual Activity   Sexual Activity Yes    Partners: Male     OB History          1    Para   1    Term   1       0    AB   0    Living   1         SAB   0    IAB   0    Ectopic   0    Multiple   0    Live Births   1               Medical History[1]  Surgical History[2]  Family History[3]    Review of Systems   All other systems reviewed and are negative.      Objective   /84   Pulse (!) 116   Wt 101 kg (222 lb 8 oz)   LMP 2025 (Exact Date)   BMI 35.91 kg/m²     Physical Exam  Constitutional:       Appearance: Normal appearance.   HENT:      Head: Normocephalic.   Cardiovascular:      Rate and Rhythm: Normal rate.   Pulmonary:      Effort: Pulmonary effort is normal.   Chest:      Comments: Declines breast exam   Genitourinary:     Comments: Declines gyn exam  Skin:     General: Skin is warm and dry.   Neurological:      Mental Status: She is alert.   Psychiatric:         Mood and Affect: Mood normal.         Behavior: Behavior normal.         Thought Content: Thought content normal.         Judgment: Judgment normal.         Assessment/Plan   Problem List Items Addressed This Visit       Encounter for routine gynecological examination - Primary    Overview   -discussed when to return for pap; 2026   -STI testing ordered  -HPV vaccination complete  -Discussed plan B today and then to start POPs tomorrow, reviewed with patient about how to take POPs and what to do if any pills are forgotten  -dicussed no UPI x1 week after starting POPs               Other Visit Diagnoses         Screening examination for STI        Relevant Orders    HIV 1/2 Antigen/Antibody Screen with Reflex to Confirmation    Hepatitis C Antibody    Hepatitis B Surface Antigen    Syphilis Screen with Reflex    C. trachomatis / N. gonorrhoeae, Amplified, Urogenital    Trichomonas vaginalis, Amplified      Birth control counseling         Relevant Medications    levonorgestrel (Plan B One-Step) 1.5 mg tablet    drospirenone, contraceptive, (Slynd) 4 mg (28) tablet            Plan:   -discussed about breast self awareness  -reviewed mammogram starting at 40 years old unless otherwise clinically indicated   -encouraged healthy eating, exercise recommendations based of off [place] 150 min of moderate intensity exercise a week  -RTC in 1  year for next annual or prn Sandous Najjar, PA-S    I was present with the PA student who participated in the documentation of this note. I have personally seen and re-examined the patient and performed the medical decision-making components (assessment and plan of care). I have reviewed the PA student documentation and verified the findings in the note as written with additions or exceptions as stated in the body of this note.    MIKE Londono-DENIA           [1]   Past Medical History:  Diagnosis Date    Asthma     Chronic hypertension     Gestational diabetes     Herpes     History of gestational diabetes mellitus     Hx of chlamydia infection 2024    Hx of gonorrhea     Mild persistent asthma 03/01/2022   [2] History reviewed. No pertinent surgical history.  [3]   Family History  Problem Relation Name Age of Onset    Hypertension Mother      Schizophrenia Mother      Hypertension Father      Celiac disease Father      Breast cancer Neg Hx      Colon cancer Neg Hx      Ovarian cancer Neg Hx      Pancreatic cancer Neg Hx

## 2025-06-04 LAB
C TRACH RRNA SPEC QL NAA+PROBE: NOT DETECTED
N GONORRHOEA RRNA SPEC QL NAA+PROBE: NOT DETECTED
QUEST GC CT AMPLIFIED (ALWAYS MESSAGE): NORMAL
T VAGINALIS RRNA SPEC QL NAA+PROBE: NOT DETECTED

## 2025-07-10 DIAGNOSIS — Z30.09 BIRTH CONTROL COUNSELING: ICD-10-CM

## 2025-07-12 DIAGNOSIS — Z30.09 BIRTH CONTROL COUNSELING: ICD-10-CM

## 2025-07-12 RX ORDER — LEVONORGESTREL 1.5 MG/1
1.5 TABLET ORAL ONCE
Qty: 1 TABLET | Refills: 5 | Status: SHIPPED | OUTPATIENT
Start: 2025-07-12 | End: 2025-07-12

## 2025-07-15 RX ORDER — LEVONORGESTREL 1.5 MG/1
1.5 TABLET ORAL ONCE
Qty: 1 TABLET | Refills: 0 | OUTPATIENT
Start: 2025-07-15 | End: 2025-07-15